# Patient Record
Sex: FEMALE | Employment: UNEMPLOYED | URBAN - METROPOLITAN AREA
[De-identification: names, ages, dates, MRNs, and addresses within clinical notes are randomized per-mention and may not be internally consistent; named-entity substitution may affect disease eponyms.]

---

## 2017-04-13 ENCOUNTER — HOSPITAL ENCOUNTER (EMERGENCY)
Age: 22
Discharge: HOME | End: 2017-04-13
Attending: EMERGENCY MEDICINE | Admitting: EMERGENCY MEDICINE
Payer: OTHER MISCELLANEOUS

## 2017-04-13 ENCOUNTER — APPOINTMENT (OUTPATIENT)
Dept: GENERAL RADIOLOGY | Age: 22
End: 2017-04-13
Attending: PHYSICIAN ASSISTANT
Payer: OTHER MISCELLANEOUS

## 2017-04-13 VITALS
WEIGHT: 118 LBS | TEMPERATURE: 100.1 F | BODY MASS INDEX: 17.88 KG/M2 | SYSTOLIC BLOOD PRESSURE: 115 MMHG | HEIGHT: 68 IN | DIASTOLIC BLOOD PRESSURE: 75 MMHG | OXYGEN SATURATION: 98 % | HEART RATE: 100 BPM | RESPIRATION RATE: 18 BRPM

## 2017-04-13 DIAGNOSIS — S41.151A DOG BITE OF RIGHT ARM, INITIAL ENCOUNTER: Primary | ICD-10-CM
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
## 2017-04-13 PROCEDURE — 96365 THER/PROPH/DIAG IV INF INIT: CPT

## 2017-04-13 PROCEDURE — 75810000293 HC SIMP/SUPERF WND  RPR

## 2017-04-13 PROCEDURE — 77030018836 HC SOL IRR NACL ICUM -A

## 2017-04-13 PROCEDURE — 77030031132 HC SUT NYL COVD -A

## 2017-04-13 PROCEDURE — 73060 X-RAY EXAM OF HUMERUS: CPT

## 2017-04-13 PROCEDURE — 96361 HYDRATE IV INFUSION ADD-ON: CPT

## 2017-04-13 PROCEDURE — 74011250637 HC RX REV CODE- 250/637: Performed by: PHYSICIAN ASSISTANT

## 2017-04-13 PROCEDURE — 74011000258 HC RX REV CODE- 258: Performed by: PHYSICIAN ASSISTANT

## 2017-04-13 PROCEDURE — 96375 TX/PRO/DX INJ NEW DRUG ADDON: CPT

## 2017-04-13 PROCEDURE — 99283 EMERGENCY DEPT VISIT LOW MDM: CPT

## 2017-04-13 PROCEDURE — 74011250636 HC RX REV CODE- 250/636: Performed by: PHYSICIAN ASSISTANT

## 2017-04-13 PROCEDURE — 74011000250 HC RX REV CODE- 250: Performed by: PHYSICIAN ASSISTANT

## 2017-04-13 PROCEDURE — 90471 IMMUNIZATION ADMIN: CPT

## 2017-04-13 PROCEDURE — 90715 TDAP VACCINE 7 YRS/> IM: CPT | Performed by: PHYSICIAN ASSISTANT

## 2017-04-13 RX ORDER — HYDROMORPHONE HYDROCHLORIDE 1 MG/ML
0.5 INJECTION, SOLUTION INTRAMUSCULAR; INTRAVENOUS; SUBCUTANEOUS
Status: COMPLETED | OUTPATIENT
Start: 2017-04-13 | End: 2017-04-13

## 2017-04-13 RX ORDER — ONDANSETRON 2 MG/ML
4 INJECTION INTRAMUSCULAR; INTRAVENOUS
Status: COMPLETED | OUTPATIENT
Start: 2017-04-13 | End: 2017-04-13

## 2017-04-13 RX ORDER — OXYCODONE AND ACETAMINOPHEN 5; 325 MG/1; MG/1
1 TABLET ORAL
Status: COMPLETED | OUTPATIENT
Start: 2017-04-13 | End: 2017-04-13

## 2017-04-13 RX ORDER — MORPHINE SULFATE 4 MG/ML
4 INJECTION, SOLUTION INTRAMUSCULAR; INTRAVENOUS ONCE
Status: COMPLETED | OUTPATIENT
Start: 2017-04-13 | End: 2017-04-13

## 2017-04-13 RX ORDER — LISINOPRIL 2.5 MG/1
TABLET ORAL DAILY
COMMUNITY

## 2017-04-13 RX ORDER — FLUOXETINE 10 MG/1
CAPSULE ORAL DAILY
COMMUNITY
End: 2017-08-09 | Stop reason: SDUPTHER

## 2017-04-13 RX ORDER — SODIUM CHLORIDE 9 MG/ML
1000 INJECTION, SOLUTION INTRAVENOUS ONCE
Status: COMPLETED | OUTPATIENT
Start: 2017-04-13 | End: 2017-04-13

## 2017-04-13 RX ORDER — NAPROXEN 375 MG/1
375 TABLET ORAL 2 TIMES DAILY WITH MEALS
Qty: 20 TAB | Refills: 0 | Status: SHIPPED | OUTPATIENT
Start: 2017-04-13 | End: 2017-08-09

## 2017-04-13 RX ORDER — AMOXICILLIN AND CLAVULANATE POTASSIUM 875; 125 MG/1; MG/1
1 TABLET, FILM COATED ORAL
Status: COMPLETED | OUTPATIENT
Start: 2017-04-13 | End: 2017-04-13

## 2017-04-13 RX ORDER — AMOXICILLIN AND CLAVULANATE POTASSIUM 875; 125 MG/1; MG/1
1 TABLET, FILM COATED ORAL 2 TIMES DAILY
Qty: 20 TAB | Refills: 0 | Status: SHIPPED | OUTPATIENT
Start: 2017-04-13 | End: 2017-08-09

## 2017-04-13 RX ORDER — ACETAMINOPHEN 325 MG/1
650 TABLET ORAL
Status: COMPLETED | OUTPATIENT
Start: 2017-04-13 | End: 2017-04-13

## 2017-04-13 RX ORDER — INSULIN ASPART 100 [IU]/ML
INJECTION, SUSPENSION SUBCUTANEOUS
COMMUNITY
End: 2017-08-09

## 2017-04-13 RX ORDER — OXYCODONE AND ACETAMINOPHEN 5; 325 MG/1; MG/1
TABLET ORAL
Qty: 20 TAB | Refills: 0 | Status: SHIPPED | OUTPATIENT
Start: 2017-04-13 | End: 2017-08-09

## 2017-04-13 RX ORDER — NORETHINDRONE ACETATE AND ETHINYL ESTRADIOL 1; .02 MG/1; MG/1
TABLET ORAL
COMMUNITY

## 2017-04-13 RX ADMIN — Medication 5 ML: at 10:47

## 2017-04-13 RX ADMIN — ONDANSETRON HYDROCHLORIDE 4 MG: 2 SOLUTION INTRAMUSCULAR; INTRAVENOUS at 10:35

## 2017-04-13 RX ADMIN — AMPICILLIN SODIUM AND SULBACTAM SODIUM 1.5 G: 1; .5 INJECTION, POWDER, FOR SOLUTION INTRAMUSCULAR; INTRAVENOUS at 16:00

## 2017-04-13 RX ADMIN — OXYCODONE HYDROCHLORIDE AND ACETAMINOPHEN 1 TABLET: 5; 325 TABLET ORAL at 13:43

## 2017-04-13 RX ADMIN — AMOXICILLIN AND CLAVULANATE POTASSIUM 1 TABLET: 875; 125 TABLET, FILM COATED ORAL at 13:45

## 2017-04-13 RX ADMIN — TETANUS TOXOID, REDUCED DIPHTHERIA TOXOID AND ACELLULAR PERTUSSIS VACCINE, ADSORBED 0.5 ML: 5; 2.5; 8; 8; 2.5 SUSPENSION INTRAMUSCULAR at 15:55

## 2017-04-13 RX ADMIN — ACETAMINOPHEN 650 MG: 325 TABLET ORAL at 16:01

## 2017-04-13 RX ADMIN — Medication 4 MG: at 10:35

## 2017-04-13 RX ADMIN — SODIUM CHLORIDE 1000 ML: 900 INJECTION, SOLUTION INTRAVENOUS at 10:37

## 2017-04-13 RX ADMIN — HYDROMORPHONE HYDROCHLORIDE 0.5 MG: 1 INJECTION, SOLUTION INTRAMUSCULAR; INTRAVENOUS; SUBCUTANEOUS at 11:25

## 2017-04-13 NOTE — ED TRIAGE NOTES
Was bitten on the right arm by a dog at the [employer] 2 deep punture wounds to the right arm. Bleeding is controlled.

## 2017-04-13 NOTE — ED NOTES
Pt states that her pain is better, but she is still hurting request more pain medication, pt states that she would not like to have tetunus shot at this time, but may decide to have the shot later

## 2017-04-13 NOTE — ED NOTES
Discharge paperwork reviewed, with pt, pt verbalized understanding.  Pt awaiting completion of IV antibiotic for discharge

## 2017-04-13 NOTE — DISCHARGE INSTRUCTIONS
Animal Bites: Care Instructions  Your Care Instructions  After an animal bite, the biggest concern is infection. The chance of infection depends on the type of animal that bit you, where on your body you were bitten, and your general health. Many animal bites are not closed with stitches, because this can increase the chance of infection. Your bite may take as little as 7 days or as long as several months to heal, depending on how bad it is. Taking good care of your wound at home will help it heal and reduce your chance of infection. The doctor has checked you carefully, but problems can develop later. If you notice any problems or new symptoms, get medical treatment right away. Follow-up care is a key part of your treatment and safety. Be sure to make and go to all appointments, and call your doctor if you are having problems. It's also a good idea to know your test results and keep a list of the medicines you take. How can you care for yourself at home? · If your doctor told you how to care for your wound, follow your doctor's instructions. If you did not get instructions, follow this general advice:  ¨ After 24 to 48 hours, gently wash the wound with clean water 2 times a day. Do not scrub or soak the wound. Don't use hydrogen peroxide or alcohol, which can slow healing. ¨ You may cover the wound with a thin layer of petroleum jelly, such as Vaseline, and a nonstick bandage. ¨ Apply more petroleum jelly and replace the bandage as needed. · After you shower, gently dry the wound with a clean towel. · If your doctor has closed the wound, cover the bandage with a plastic bag before you take a shower. · A small amount of skin redness and swelling around the wound edges and the stitches or staples is normal. Your wound may itch or feel irritated. Do not scratch or rub the wound.   · Ask your doctor if you can take an over-the-counter pain medicine, such as acetaminophen (Tylenol), ibuprofen (Advil, Motrin), or naproxen (Aleve). Read and follow all instructions on the label. · Do not take two or more pain medicines at the same time unless the doctor told you to. Many pain medicines have acetaminophen, which is Tylenol. Too much acetaminophen (Tylenol) can be harmful. · If your bite puts you at risk for rabies, you will get a series of shots over the next few weeks to prevent rabies. Your doctor will tell you when to get the shots. It is very important that you get the full cycle of shots. Follow your doctor's instructions exactly. · You may need a tetanus shot if you have not received one in the last 5 years. · If your doctor prescribed antibiotics, take them as directed. Do not stop taking them just because you feel better. You need to take the full course of antibiotics. When should you call for help? Call your doctor now or seek immediate medical care if:  · The skin near the bite turns cold or pale or it changes color. · You lose feeling in the area near the bite, or it feels numb or tingly. · You have trouble moving a limb near the bite. · You have symptoms of infection, such as:  ¨ Increased pain, swelling, warmth, or redness near the wound. ¨ Red streaks leading from the wound. ¨ Pus draining from the wound. ¨ A fever. · Blood soaks through the bandage. Oozing small amounts of blood is normal.  · Your pain is getting worse. Watch closely for changes in your health, and be sure to contact your doctor if you are not getting better as expected. Where can you learn more? Go to http://jackie-mor.info/. Enter Q644 in the search box to learn more about \"Animal Bites: Care Instructions. \"  Current as of: May 27, 2016  Content Version: 11.2  © 4163-8767 [employer]. Care instructions adapted under license by [employer] (which disclaims liability or warranty for this information).  If you have questions about a medical condition or this instruction, always ask your healthcare professional. [place] [address] any warranty or liability for your use of this information.

## 2017-04-13 NOTE — ED NOTES
I have reviewed discharge instructions with the patient. The patient verbalized understanding. IV discontinued, Animal control was in Ed to do report on dog.

## 2017-04-13 NOTE — ED NOTES
This nurse at bedside, to dress pts dog bite, pt has complaints of pain, provider made aware, new orders received

## 2017-08-09 RX ORDER — FLUOXETINE HYDROCHLORIDE 20 MG/1
20 CAPSULE ORAL DAILY
Refills: 10 | COMMUNITY
Start: 2017-07-19

## 2017-08-14 NOTE — DIABETES MGMT
INSULIN PUMP CONSULT/ Plan Of Care  How long have you had diabetes? Diagnosed with Type 1 diabetes at age 25  How long have you had an insulin pump? Not sure  What is your blood glucose target?  mg/dl  How often do you usually test your blood glucose in a day? Twice a day  What was your most recent A1C and date? unknown  Who is your endocrinologist? Dr. Marie Murcia  When was your last visit to your endocrinologist?  August 2017    INSULIN PUMP    Brand of pump and model #:  V-Go 20   Type of insulin used    What are your basal rate(s)?  0.83 units/hr   What is your insulin to carbohydrate ratio? What is your total daily dose? Changes daily based on carbs consumed     Do you often have hypoglycemia? Occasional hypoglycemia about 4 times/month  How do you treat hypoglycemia?  juice  Do you have any site problems?  no  Do you feel able and confident to manage your pump while here? Yes. Who helps you manage your insulin pump when you are not able? Patients  will be with patient. The V-Go insulin pump is changed every 24 hours. Patient usually changes hers around bedtime. Instructed patient to wear her pump in on the day of surgery. She knows not to give herself additional insulin because she will not be eating. Patient had no questions or concerns at this time.

## 2017-08-16 ENCOUNTER — ANESTHESIA EVENT (OUTPATIENT)
Dept: SURGERY | Age: 22
End: 2017-08-16
Payer: OTHER MISCELLANEOUS

## 2017-08-17 ENCOUNTER — HOSPITAL ENCOUNTER (OUTPATIENT)
Age: 22
Setting detail: OUTPATIENT SURGERY
Discharge: HOME | End: 2017-08-17
Attending: PLASTIC SURGERY | Admitting: PLASTIC SURGERY
Payer: OTHER MISCELLANEOUS

## 2017-08-17 ENCOUNTER — ANESTHESIA (OUTPATIENT)
Dept: SURGERY | Age: 22
End: 2017-08-17
Payer: OTHER MISCELLANEOUS

## 2017-08-17 VITALS
SYSTOLIC BLOOD PRESSURE: 111 MMHG | WEIGHT: 118.31 LBS | HEART RATE: 107 BPM | OXYGEN SATURATION: 100 % | HEIGHT: 68 IN | RESPIRATION RATE: 15 BRPM | BODY MASS INDEX: 17.93 KG/M2 | TEMPERATURE: 99.6 F | DIASTOLIC BLOOD PRESSURE: 67 MMHG

## 2017-08-17 LAB
GLUCOSE BLD STRIP.AUTO-MCNC: 168 MG/DL (ref 70–110)
GLUCOSE BLD STRIP.AUTO-MCNC: 231 MG/DL (ref 70–110)
GLUCOSE BLD STRIP.AUTO-MCNC: 311 MG/DL (ref 70–110)
GLUCOSE BLD STRIP.AUTO-MCNC: 312 MG/DL (ref 70–110)
GLUCOSE BLD STRIP.AUTO-MCNC: 313 MG/DL (ref 70–110)
HCG UR QL: NEGATIVE

## 2017-08-17 PROCEDURE — 77030000032 HC CUF TRNQT ZIMM -B: Performed by: PLASTIC SURGERY

## 2017-08-17 PROCEDURE — 77030034479 HC ADH SKN CLSR PRINEO J&J -B: Performed by: PLASTIC SURGERY

## 2017-08-17 PROCEDURE — 74011250636 HC RX REV CODE- 250/636: Performed by: NURSE ANESTHETIST, CERTIFIED REGISTERED

## 2017-08-17 PROCEDURE — 77030020753 HC CUF TRNQT 1BLA STRY -B: Performed by: PLASTIC SURGERY

## 2017-08-17 PROCEDURE — 74011250636 HC RX REV CODE- 250/636

## 2017-08-17 PROCEDURE — 77030002936 HC SUT MERS J&J -A: Performed by: PLASTIC SURGERY

## 2017-08-17 PROCEDURE — 77030010813: Performed by: PLASTIC SURGERY

## 2017-08-17 PROCEDURE — 77030008477 HC STYL SATN SLP COVD -A: Performed by: NURSE ANESTHETIST, CERTIFIED REGISTERED

## 2017-08-17 PROCEDURE — 76010000178 HC OR TIME 5.5 TO 6 HR INTENSV-TIER 1: Performed by: PLASTIC SURGERY

## 2017-08-17 PROCEDURE — 76210000016 HC OR PH I REC 1 TO 1.5 HR: Performed by: PLASTIC SURGERY

## 2017-08-17 PROCEDURE — 77030020335 HC PDNG CST COVD -A: Performed by: PLASTIC SURGERY

## 2017-08-17 PROCEDURE — 74011250637 HC RX REV CODE- 250/637: Performed by: ANESTHESIOLOGY

## 2017-08-17 PROCEDURE — 74011000258 HC RX REV CODE- 258

## 2017-08-17 PROCEDURE — 77030013079 HC BLNKT BAIR HGGR 3M -A: Performed by: NURSE ANESTHETIST, CERTIFIED REGISTERED

## 2017-08-17 PROCEDURE — 88305 TISSUE EXAM BY PATHOLOGIST: CPT | Performed by: PLASTIC SURGERY

## 2017-08-17 PROCEDURE — 77030008683 HC TU ET CUF COVD -A: Performed by: NURSE ANESTHETIST, CERTIFIED REGISTERED

## 2017-08-17 PROCEDURE — 74011636637 HC RX REV CODE- 636/637: Performed by: NURSE ANESTHETIST, CERTIFIED REGISTERED

## 2017-08-17 PROCEDURE — 77030002933 HC SUT MCRYL J&J -A: Performed by: PLASTIC SURGERY

## 2017-08-17 PROCEDURE — 77030031139 HC SUT VCRL2 J&J -A: Performed by: PLASTIC SURGERY

## 2017-08-17 PROCEDURE — 76060000042 HC ANESTHESIA 5.5 TO 6 HR: Performed by: PLASTIC SURGERY

## 2017-08-17 PROCEDURE — 77030012407 HC DRN WND BARD -B: Performed by: PLASTIC SURGERY

## 2017-08-17 PROCEDURE — 74011636637 HC RX REV CODE- 636/637: Performed by: ANESTHESIOLOGY

## 2017-08-17 PROCEDURE — 77030018842 HC SOL IRR SOD CL 9% BAXT -A: Performed by: PLASTIC SURGERY

## 2017-08-17 PROCEDURE — 77030002917 HC SUT ETHLN J&J -B: Performed by: PLASTIC SURGERY

## 2017-08-17 PROCEDURE — 77030032490 HC SLV COMPR SCD KNE COVD -B: Performed by: PLASTIC SURGERY

## 2017-08-17 PROCEDURE — 76210000020 HC REC RM PH II FIRST 0.5 HR: Performed by: PLASTIC SURGERY

## 2017-08-17 PROCEDURE — 82962 GLUCOSE BLOOD TEST: CPT

## 2017-08-17 PROCEDURE — 81025 URINE PREGNANCY TEST: CPT

## 2017-08-17 PROCEDURE — 74011250637 HC RX REV CODE- 250/637: Performed by: PLASTIC SURGERY

## 2017-08-17 PROCEDURE — 74011000272 HC RX REV CODE- 272: Performed by: PLASTIC SURGERY

## 2017-08-17 PROCEDURE — 74011000250 HC RX REV CODE- 250

## 2017-08-17 PROCEDURE — 77030011640 HC PAD GRND REM COVD -A: Performed by: PLASTIC SURGERY

## 2017-08-17 PROCEDURE — 77030011265 HC ELECTRD BLD HEX COVD -A: Performed by: PLASTIC SURGERY

## 2017-08-17 PROCEDURE — 77030002986 HC SUT PROL J&J -A: Performed by: PLASTIC SURGERY

## 2017-08-17 PROCEDURE — 74011250637 HC RX REV CODE- 250/637: Performed by: NURSE ANESTHETIST, CERTIFIED REGISTERED

## 2017-08-17 PROCEDURE — 77030020268 HC MISC GENERAL SUPPLY: Performed by: PLASTIC SURGERY

## 2017-08-17 PROCEDURE — 77030008462 HC STPLR SKN PROX J&J -A: Performed by: PLASTIC SURGERY

## 2017-08-17 PROCEDURE — 77030012422 HC DRN WND COVD -A: Performed by: PLASTIC SURGERY

## 2017-08-17 RX ORDER — LIDOCAINE HYDROCHLORIDE 20 MG/ML
INJECTION, SOLUTION EPIDURAL; INFILTRATION; INTRACAUDAL; PERINEURAL AS NEEDED
Status: DISCONTINUED | OUTPATIENT
Start: 2017-08-17 | End: 2017-08-17 | Stop reason: HOSPADM

## 2017-08-17 RX ORDER — MAGNESIUM SULFATE 100 %
4 CRYSTALS MISCELLANEOUS AS NEEDED
Status: DISCONTINUED | OUTPATIENT
Start: 2017-08-17 | End: 2017-08-17 | Stop reason: SDUPTHER

## 2017-08-17 RX ORDER — DEXTROSE 50 % IN WATER (D50W) INTRAVENOUS SYRINGE
25-50 AS NEEDED
Status: DISCONTINUED | OUTPATIENT
Start: 2017-08-17 | End: 2017-08-17 | Stop reason: HOSPADM

## 2017-08-17 RX ORDER — ONDANSETRON 2 MG/ML
INJECTION INTRAMUSCULAR; INTRAVENOUS AS NEEDED
Status: DISCONTINUED | OUTPATIENT
Start: 2017-08-17 | End: 2017-08-17 | Stop reason: HOSPADM

## 2017-08-17 RX ORDER — CEFAZOLIN SODIUM 1 G/3ML
INJECTION, POWDER, FOR SOLUTION INTRAMUSCULAR; INTRAVENOUS AS NEEDED
Status: DISCONTINUED | OUTPATIENT
Start: 2017-08-17 | End: 2017-08-17 | Stop reason: HOSPADM

## 2017-08-17 RX ORDER — DEXTROSE 50 % IN WATER (D50W) INTRAVENOUS SYRINGE
25-50 AS NEEDED
Status: DISCONTINUED | OUTPATIENT
Start: 2017-08-17 | End: 2017-08-17 | Stop reason: SDUPTHER

## 2017-08-17 RX ORDER — INSULIN LISPRO 100 [IU]/ML
INJECTION, SOLUTION INTRAVENOUS; SUBCUTANEOUS ONCE
Status: DISCONTINUED | OUTPATIENT
Start: 2017-08-17 | End: 2017-08-17 | Stop reason: SDUPTHER

## 2017-08-17 RX ORDER — HYDROMORPHONE HYDROCHLORIDE 2 MG/ML
INJECTION, SOLUTION INTRAMUSCULAR; INTRAVENOUS; SUBCUTANEOUS AS NEEDED
Status: DISCONTINUED | OUTPATIENT
Start: 2017-08-17 | End: 2017-08-17 | Stop reason: HOSPADM

## 2017-08-17 RX ORDER — PROPOFOL 10 MG/ML
INJECTION, EMULSION INTRAVENOUS AS NEEDED
Status: DISCONTINUED | OUTPATIENT
Start: 2017-08-17 | End: 2017-08-17 | Stop reason: HOSPADM

## 2017-08-17 RX ORDER — HYDROMORPHONE HYDROCHLORIDE 1 MG/ML
0.5 INJECTION, SOLUTION INTRAMUSCULAR; INTRAVENOUS; SUBCUTANEOUS
Status: DISCONTINUED | OUTPATIENT
Start: 2017-08-17 | End: 2017-08-17 | Stop reason: HOSPADM

## 2017-08-17 RX ORDER — SUCCINYLCHOLINE CHLORIDE 20 MG/ML
INJECTION INTRAMUSCULAR; INTRAVENOUS AS NEEDED
Status: DISCONTINUED | OUTPATIENT
Start: 2017-08-17 | End: 2017-08-17 | Stop reason: HOSPADM

## 2017-08-17 RX ORDER — CEPHALEXIN 500 MG/1
500 CAPSULE ORAL 4 TIMES DAILY
Qty: 28 CAP | Refills: 0 | Status: SHIPPED | OUTPATIENT
Start: 2017-08-17 | End: 2017-08-24

## 2017-08-17 RX ORDER — INSULIN LISPRO 100 [IU]/ML
INJECTION, SOLUTION INTRAVENOUS; SUBCUTANEOUS ONCE
Status: COMPLETED | OUTPATIENT
Start: 2017-08-17 | End: 2017-08-17

## 2017-08-17 RX ORDER — INSULIN LISPRO 100 [IU]/ML
INJECTION, SOLUTION INTRAVENOUS; SUBCUTANEOUS
Status: DISCONTINUED
Start: 2017-08-17 | End: 2017-08-17 | Stop reason: HOSPADM

## 2017-08-17 RX ORDER — MAGNESIUM SULFATE 100 %
4 CRYSTALS MISCELLANEOUS AS NEEDED
Status: DISCONTINUED | OUTPATIENT
Start: 2017-08-17 | End: 2017-08-17 | Stop reason: HOSPADM

## 2017-08-17 RX ORDER — FENTANYL CITRATE 50 UG/ML
50 INJECTION, SOLUTION INTRAMUSCULAR; INTRAVENOUS AS NEEDED
Status: DISCONTINUED | OUTPATIENT
Start: 2017-08-17 | End: 2017-08-17 | Stop reason: HOSPADM

## 2017-08-17 RX ORDER — SODIUM CHLORIDE, SODIUM LACTATE, POTASSIUM CHLORIDE, CALCIUM CHLORIDE 600; 310; 30; 20 MG/100ML; MG/100ML; MG/100ML; MG/100ML
75 INJECTION, SOLUTION INTRAVENOUS CONTINUOUS
Status: DISCONTINUED | OUTPATIENT
Start: 2017-08-17 | End: 2017-08-17 | Stop reason: HOSPADM

## 2017-08-17 RX ORDER — MIDAZOLAM HYDROCHLORIDE 1 MG/ML
INJECTION, SOLUTION INTRAMUSCULAR; INTRAVENOUS AS NEEDED
Status: DISCONTINUED | OUTPATIENT
Start: 2017-08-17 | End: 2017-08-17 | Stop reason: HOSPADM

## 2017-08-17 RX ORDER — OXYCODONE AND ACETAMINOPHEN 5; 325 MG/1; MG/1
1 TABLET ORAL
Status: COMPLETED | OUTPATIENT
Start: 2017-08-17 | End: 2017-08-17

## 2017-08-17 RX ORDER — FENTANYL CITRATE 50 UG/ML
INJECTION, SOLUTION INTRAMUSCULAR; INTRAVENOUS AS NEEDED
Status: DISCONTINUED | OUTPATIENT
Start: 2017-08-17 | End: 2017-08-17 | Stop reason: HOSPADM

## 2017-08-17 RX ORDER — FAMOTIDINE 20 MG/1
20 TABLET, FILM COATED ORAL ONCE
Status: COMPLETED | OUTPATIENT
Start: 2017-08-17 | End: 2017-08-17

## 2017-08-17 RX ORDER — ONDANSETRON 4 MG/1
4 TABLET, ORALLY DISINTEGRATING ORAL
Qty: 6 TAB | Refills: 0 | Status: SHIPPED | OUTPATIENT
Start: 2017-08-17

## 2017-08-17 RX ORDER — DEXAMETHASONE SODIUM PHOSPHATE 4 MG/ML
INJECTION, SOLUTION INTRA-ARTICULAR; INTRALESIONAL; INTRAMUSCULAR; INTRAVENOUS; SOFT TISSUE AS NEEDED
Status: DISCONTINUED | OUTPATIENT
Start: 2017-08-17 | End: 2017-08-17 | Stop reason: HOSPADM

## 2017-08-17 RX ORDER — OXYCODONE AND ACETAMINOPHEN 7.5; 325 MG/1; MG/1
1 TABLET ORAL
Qty: 36 TAB | Refills: 0 | Status: SHIPPED | OUTPATIENT
Start: 2017-08-17

## 2017-08-17 RX ORDER — SODIUM CHLORIDE, SODIUM LACTATE, POTASSIUM CHLORIDE, CALCIUM CHLORIDE 600; 310; 30; 20 MG/100ML; MG/100ML; MG/100ML; MG/100ML
50 INJECTION, SOLUTION INTRAVENOUS CONTINUOUS
Status: DISCONTINUED | OUTPATIENT
Start: 2017-08-17 | End: 2017-08-17 | Stop reason: HOSPADM

## 2017-08-17 RX ADMIN — SODIUM CHLORIDE, SODIUM LACTATE, POTASSIUM CHLORIDE, AND CALCIUM CHLORIDE 75 ML/HR: 600; 310; 30; 20 INJECTION, SOLUTION INTRAVENOUS at 09:25

## 2017-08-17 RX ADMIN — SUCCINYLCHOLINE CHLORIDE 80 MG: 20 INJECTION INTRAMUSCULAR; INTRAVENOUS at 11:07

## 2017-08-17 RX ADMIN — INSULIN LISPRO 3 UNITS: 100 INJECTION, SOLUTION INTRAVENOUS; SUBCUTANEOUS at 09:52

## 2017-08-17 RX ADMIN — HYDROMORPHONE HYDROCHLORIDE 1 MG: 2 INJECTION, SOLUTION INTRAMUSCULAR; INTRAVENOUS; SUBCUTANEOUS at 11:04

## 2017-08-17 RX ADMIN — SODIUM CHLORIDE, SODIUM LACTATE, POTASSIUM CHLORIDE, AND CALCIUM CHLORIDE: 600; 310; 30; 20 INJECTION, SOLUTION INTRAVENOUS at 16:29

## 2017-08-17 RX ADMIN — INSULIN LISPRO 12 UNITS: 100 INJECTION, SOLUTION INTRAVENOUS; SUBCUTANEOUS at 17:30

## 2017-08-17 RX ADMIN — FAMOTIDINE 20 MG: 20 TABLET ORAL at 09:28

## 2017-08-17 RX ADMIN — ONDANSETRON 4 MG: 2 INJECTION INTRAMUSCULAR; INTRAVENOUS at 11:04

## 2017-08-17 RX ADMIN — FENTANYL CITRATE 25 MCG: 50 INJECTION, SOLUTION INTRAMUSCULAR; INTRAVENOUS at 17:39

## 2017-08-17 RX ADMIN — OXYCODONE AND ACETAMINOPHEN 1 TABLET: 5; 325 TABLET ORAL at 18:20

## 2017-08-17 RX ADMIN — FENTANYL CITRATE 50 MCG: 50 INJECTION, SOLUTION INTRAMUSCULAR; INTRAVENOUS at 14:27

## 2017-08-17 RX ADMIN — FENTANYL CITRATE 50 MCG: 50 INJECTION, SOLUTION INTRAMUSCULAR; INTRAVENOUS at 14:55

## 2017-08-17 RX ADMIN — MIDAZOLAM HYDROCHLORIDE 2 MG: 1 INJECTION, SOLUTION INTRAMUSCULAR; INTRAVENOUS at 11:01

## 2017-08-17 RX ADMIN — SODIUM CHLORIDE, SODIUM LACTATE, POTASSIUM CHLORIDE, AND CALCIUM CHLORIDE: 600; 310; 30; 20 INJECTION, SOLUTION INTRAVENOUS at 13:00

## 2017-08-17 RX ADMIN — HYDROMORPHONE HYDROCHLORIDE 1 MG: 2 INJECTION, SOLUTION INTRAMUSCULAR; INTRAVENOUS; SUBCUTANEOUS at 11:07

## 2017-08-17 RX ADMIN — LIDOCAINE HYDROCHLORIDE 40 MG: 20 INJECTION, SOLUTION EPIDURAL; INFILTRATION; INTRACAUDAL; PERINEURAL at 11:07

## 2017-08-17 RX ADMIN — PROPOFOL 180 MG: 10 INJECTION, EMULSION INTRAVENOUS at 11:07

## 2017-08-17 RX ADMIN — CEFAZOLIN SODIUM 1 G: 1 INJECTION, POWDER, FOR SOLUTION INTRAMUSCULAR; INTRAVENOUS at 11:21

## 2017-08-17 RX ADMIN — DEXAMETHASONE SODIUM PHOSPHATE 4 MG: 4 INJECTION, SOLUTION INTRA-ARTICULAR; INTRALESIONAL; INTRAMUSCULAR; INTRAVENOUS; SOFT TISSUE at 11:04

## 2017-08-17 RX ADMIN — INSULIN LISPRO 12 UNITS: 100 INJECTION, SOLUTION INTRAVENOUS; SUBCUTANEOUS at 16:51

## 2017-08-17 NOTE — ANESTHESIA POSTPROCEDURE EVALUATION
Post-Anesthesia Evaluation and Assessment    Patient: Carlos Rinaldi MRN: [id number]  SSN: [id number]    YOB: 1995  Age: 25 y.o. Sex: female      Data from PACU flowsheet    Cardiovascular Function/Vital Signs  Visit Vitals    /67    Pulse (!) 115    Temp 37.6 °C (99.6 °F)    Resp 10    Ht 5' 8\" (1.727 m)    Wt 53.7 kg (118 lb 5 oz)    SpO2 100%    BMI 17.99 kg/m2       Patient is status post anesthesia    Nausea/Vomiting: controlled    Postoperative hydration reviewed and adequate. Pain:  Managed    Neurological Status: At baseline    Mental Status and Level of Consciousness: Alert and oriented     Pulmonary Status:   Adequate oxygenation and airway patent    Complications related to anesthesia: None    Post-anesthesia assessment completed. Blood sugar being managed.      Signed By: Devora Welch CRNA     August 17, 2017

## 2017-08-17 NOTE — H&P
Date of Surgery Update:  Carlos Rinaldi was seen and examined. History and physical has been reviewed. The patient has been examined.  There have been no significant clinical changes since the completion of the originally dated History and Physical.    Signed By: Melissa Jacobs MD     August 17, 2017 10:03 AM

## 2017-08-17 NOTE — PERIOP NOTES
Per Dr. Elina Phillip, no further treatment needed for BG; pt would like to reconnect to her insulin pump after discharge.

## 2017-08-17 NOTE — DISCHARGE INSTRUCTIONS
DISCHARGE SUMMARY from Nurse    The following personal items are in your possession at time of discharge:    Dental Appliances: None  Visual Aid: Glasses     Home Medications: None  Jewelry: Ring  Clothing: Undergarments, Shirt, Pants, Footwear  Other Valuables: None             PATIENT INSTRUCTIONS:    After general anesthesia or intravenous sedation, for 24 hours or while taking prescription Narcotics:  · Limit your activities  · Do not drive and operate hazardous machinery  · Do not make important personal or business decisions  · Do  not drink alcoholic beverages  · If you have not urinated within 8 hours after discharge, please contact your surgeon on call. Report the following to your surgeon:  · Excessive pain, swelling, redness or odor of or around the surgical area  · Temperature over 100.5  · Nausea and vomiting lasting longer than 4 hours or if unable to take medications  · Any signs of decreased circulation or nerve impairment to extremity: change in color, persistent  numbness, tingling, coldness or increase pain  · Any questions        What to do at Home:  Recommended activity: Activity as tolerated and no driving for today. These are general instructions for a healthy lifestyle:    No smoking/ No tobacco products/ Avoid exposure to second hand smoke    Surgeon General's Warning:  Quitting smoking now greatly reduces serious risk to your health. Obesity, smoking, and sedentary lifestyle greatly increases your risk for illness    A healthy diet, regular physical exercise & weight monitoring are important for maintaining a healthy lifestyle    You may be retaining fluid if you have a history of heart failure or if you experience any of the following symptoms:  Weight gain of 3 pounds or more overnight or 5 pounds in a week, increased swelling in our hands or feet or shortness of breath while lying flat in bed.   Please call your doctor as soon as you notice any of these symptoms; do not wait until your next office visit. Recognize signs and symptoms of STROKE:    F-face looks uneven    A-arms unable to move or move unevenly    S-speech slurred or non-existent    T-time-call 911 as soon as signs and symptoms begin-DO NOT go       Back to bed or wait to see if you get better-TIME IS BRAIN. Warning Signs of HEART ATTACK     Call 911 if you have these symptoms:   Chest discomfort. Most heart attacks involve discomfort in the center of the chest that lasts more than a few minutes, or that goes away and comes back. It can feel like uncomfortable pressure, squeezing, fullness, or pain.  Discomfort in other areas of the upper body. Symptoms can include pain or discomfort in one or both arms, the back, neck, jaw, or stomach.  Shortness of breath with or without chest discomfort.  Other signs may include breaking out in a cold sweat, nausea, or lightheadedness. Don't wait more than five minutes to call 911 - MINUTES MATTER! Fast action can save your life. Calling 911 is almost always the fastest way to get lifesaving treatment. Emergency Medical Services staff can begin treatment when they arrive -- up to an hour sooner than if someone gets to the hospital by car. The discharge information has been reviewed with the patient. The patient verbalized understanding. Discharge medications reviewed with the patient and appropriate educational materials and side effects teaching were provided. Patient armband removed and given to patient to take home.   Patient was informed of the privacy risks if armband lost or stolen

## 2017-08-17 NOTE — DIABETES MGMT
Patient removed home insulin pump and will resume when she arrives home.  mg/dl. Patient covered with lispro 3 units per insulin protocol. Patient had no questions or concerns at this time.

## 2017-08-17 NOTE — IP AVS SNAPSHOT
Bhargav Saeed 
 
 
 [address] [place] [address] Drive 
[phone] Patient: Mason Murillo MRN: [id number] GEP:[birth year] You are allergic to the following No active allergies Recent Documentation Height Weight BMI OB Status Smoking Status 1.727 m 53.7 kg 17.99 kg/m2 Having regular periods Never Smoker Emergency Contacts Name Discharge Info Relation Home Work Mobile Gasper Mueller DISCHARGE CAREGIVER [3] Spouse [3]   [phone] About your hospitalization You were admitted on:  August 17, 2017 [facility] on:  August 17, 2017 Unit phone number:  [phone] Why you were hospitalized Your primary diagnosis was:  Not on File Providers Seen During Your Hospitalizations Provider Role Specialty Primary office phone Leland Herrera MD Attending Provider Plastic Surgery [phone] Your Primary Care Physician (PCP) Primary Care Physician Office Phone Office Fax OTHER, PHYS ** None ** ** None ** Follow-up Information Follow up With Details Comments Contact Info Marylu Mondragon MD   Patient can only remember the practice name and not the physician Current Discharge Medication List  
  
START taking these medications Dose & Instructions Dispensing Information Comments Morning Noon Evening Bedtime  
 cephALEXin 500 mg capsule Commonly known as:  Polo [place] Your last dose was: Your next dose is:    
   
   
 Dose:  500 mg Take 1 Cap by mouth four (4) times daily for 7 days. Quantity:  28 Cap Refills:  0  
     
   
   
   
  
 ondansetron 4 mg disintegrating tablet Commonly known as:  ZOFRAN ODT Your last dose was: Your next dose is:    
   
   
 Dose:  4 mg Take 1 Tab by mouth every eight (8) hours as needed for Nausea. Quantity:  6 Tab Refills:  0 oxyCODONE-acetaminophen 7.5-325 mg per tablet Commonly known as:  PERCOCET 7.5 Your last dose was: Your next dose is:    
   
   
 Dose:  1 Tab Take 1 Tab by mouth every four (4) hours as needed for Pain. Max Daily Amount: 6 Tabs. Quantity:  36 Tab Refills:  0 CONTINUE these medications which have NOT CHANGED Dose & Instructions Dispensing Information Comments Morning Noon Evening Bedtime  
  insulin pump Misc Commonly known as:  PATIENT SUPPLIED Your last dose was: Your next dose is:    
   
   
 by SubCUTAneous route as needed. Refills:  0 FLUoxetine 20 mg capsule Commonly known as:  PROzac Your last dose was: Your next dose is:    
   
   
 Dose:  20 mg Take 20 mg by mouth daily. Refills:  10 JUNEL 1/20 (21) 1-20 mg-mcg Tab Generic drug:  norethindrone-ethinyl estradiol Your last dose was: Your next dose is: Take  by mouth. Refills:  0  
     
   
   
   
  
 lisinopril 2.5 mg tablet Commonly known as:  Velora Ed Your last dose was: Your next dose is: Take  by mouth daily. Refills:  0 Where to Get Your Medications Information on where to get these meds will be given to you by the nurse or doctor. ! Ask your nurse or doctor about these medications  
  cephALEXin 500 mg capsule  
 ondansetron 4 mg disintegrating tablet  
 oxyCODONE-acetaminophen 7.5-325 mg per tablet Discharge Instructions DISCHARGE SUMMARY from Nurse The following personal items are in your possession at time of discharge: 
 
Dental Appliances: None Visual Aid: Glasses Home Medications: None Jewelry: Ring Clothing: Undergarments, Shirt, Pants, Footwear Other Valuables: None PATIENT INSTRUCTIONS: 
 
 After general anesthesia or intravenous sedation, for 24 hours or while taking prescription Narcotics: · Limit your activities · Do not drive and operate hazardous machinery · Do not make important personal or business decisions · Do  not drink alcoholic beverages · If you have not urinated within 8 hours after discharge, please contact your surgeon on call. Report the following to your surgeon: 
· Excessive pain, swelling, redness or odor of or around the surgical area · Temperature over 100.5 · Nausea and vomiting lasting longer than 4 hours or if unable to take medications · Any signs of decreased circulation or nerve impairment to extremity: change in color, persistent  numbness, tingling, coldness or increase pain · Any questions What to do at Home: 
Recommended activity: Activity as tolerated and no driving for today. These are general instructions for a healthy lifestyle: No smoking/ No tobacco products/ Avoid exposure to second hand smoke Surgeon General's Warning:  Quitting smoking now greatly reduces serious risk to your health. Obesity, smoking, and sedentary lifestyle greatly increases your risk for illness A healthy diet, regular physical exercise & weight monitoring are important for maintaining a healthy lifestyle You may be retaining fluid if you have a history of heart failure or if you experience any of the following symptoms:  Weight gain of 3 pounds or more overnight or 5 pounds in a week, increased swelling in our hands or feet or shortness of breath while lying flat in bed. Please call your doctor as soon as you notice any of these symptoms; do not wait until your next office visit. Recognize signs and symptoms of STROKE: 
 
F-face looks uneven A-arms unable to move or move unevenly S-speech slurred or non-existent T-time-call 911 as soon as signs and symptoms begin-DO NOT go Back to bed or wait to see if you get better-TIME IS BRAIN. Warning Signs of HEART ATTACK Call 911 if you have these symptoms: 
? Chest discomfort. Most heart attacks involve discomfort in the center of the chest that lasts more than a few minutes, or that goes away and comes back. It can feel like uncomfortable pressure, squeezing, fullness, or pain. ? Discomfort in other areas of the upper body. Symptoms can include pain or discomfort in one or both arms, the back, neck, jaw, or stomach. ? Shortness of breath with or without chest discomfort. ? Other signs may include breaking out in a cold sweat, nausea, or lightheadedness. Don't wait more than five minutes to call [address]! Fast action can save your life. Calling 911 is almost always the fastest way to get lifesaving treatment. Emergency Medical Services staff can begin treatment when they arrive  up to an hour sooner than if someone gets to the hospital by car. The discharge information has been reviewed with the patient. The patient verbalized understanding. Discharge medications reviewed with the patient and appropriate educational materials and side effects teaching were provided. Patient armband removed and given to patient to take home. Patient was informed of the privacy risks if armband lost or stolen Discharge Orders None Introducing [facility] SERVICES! Sandra Montez introduces [employer]terG patient portal. Now you can access parts of your medical record, email your doctor's office, and request medication refills online. 1. In your internet browser, go to [email or web address] 2. Click on the First Time User? Click Here link in the Sign In box. You will see the New Member Sign Up page. 3. Enter your [employer] Access Code exactly as it appears below. You will not need to use this code after youve completed the sign-up process. If you do not sign up before the expiration date, you must request a new code. · [employer]ter[employer] Access Code: [email or web address] Expires: 11/15/2017  7:16 AM 
 
4. Enter the last four digits of your Social Security Number (xxxx) and Date of Birth (mm/dd/yyyy) as indicated and click Submit. You will be taken to the next sign-up page. 5. Create a Triviala ID. This will be your Triviala login ID and cannot be changed, so think of one that is secure and easy to remember. 6. Create a Triviala password. You can change your password at any time. 7. Enter your Password Reset Question and Answer. This can be used at a later time if you forget your password. 8. Enter your e-mail address. You will receive e-mail notification when new information is available in [address] 9. Click Sign Up. You can now view and download portions of your medical record. 10. Click the Download Summary menu link to download a portable copy of your medical information. If you have questions, please visit the Frequently Asked Questions section of the Triviala website. Remember, Triviala is NOT to be used for urgent needs. For medical emergencies, dial 911. Now available from your iPhone and Android! General Information Please provide this summary of care documentation to your next provider. Patient Signature:  ____________________________________________________________ Date:  ____________________________________________________________  
  
Myrna Mack Provider Signature:  ____________________________________________________________ Date:  ____________________________________________________________

## 2017-08-17 NOTE — PERIOP NOTES
[id number] Received Patient to PACU. To monitors per protocol. Will continued to monitor.      1620 Paged Dr Kayy Hernandez and made aware of BS of 311 after 12 U lispro orders to cont S S protocol   9214  Report to [facility] RN shift relief

## 2017-08-17 NOTE — PERIOP NOTES
Called out to the waiting room to speak with the patient's family member (  Quirino East) but no answer/response to be informed that the patient is doing well and that the surgery is ongoing, will call out to the waiting room later.

## 2017-08-17 NOTE — ANESTHESIA PREPROCEDURE EVALUATION
Anesthetic History   No history of anesthetic complications            Review of Systems / Medical History  Patient summary reviewed and pertinent labs reviewed    Pulmonary  Within defined limits                 Neuro/Psych   Within defined limits           Cardiovascular  Within defined limits                Exercise tolerance: >4 METS     GI/Hepatic/Renal  Within defined limits              Endo/Other    Diabetes: type 1, using insulin         Other Findings   Comments:   Risk Factors for Postoperative nausea/vomiting:       History of postoperative nausea/vomiting? NO       Female? YES       Motion sickness? NO       Intended opioid administration for postoperative analgesia? YES      Smoking Abstinence  Current Smoker? NO  Elective Surgery? YES  Seen preoperatively by anesthesiologist or proxy prior to day of surgery? YES  Pt abstained from smoking 24 hours prior to anesthesia?  N/A           Physical Exam    Airway  Mallampati: II  TM Distance: 4 - 6 cm  Neck ROM: normal range of motion   Mouth opening: Normal     Cardiovascular  Regular rate and rhythm,  S1 and S2 normal,  no murmur, click, rub, or gallop  Rhythm: regular  Rate: normal         Dental  No notable dental hx       Pulmonary  Breath sounds clear to auscultation               Abdominal  GI exam deferred       Other Findings            Anesthetic Plan    ASA: 2  Anesthesia type: general          Induction: Intravenous  Anesthetic plan and risks discussed with: Patient

## 2017-08-22 NOTE — OP NOTES
[place]    Name:  Maverick Gonzalez  MR#:  [id number]  :  1995  Account #:  [de-identified]  Date of Adm:  2017  Date of Surgery:  2017      PREOPERATIVE DIAGNOSIS: Dog bite, right upper arm. POSTOPERATIVE DIAGNOSIS: Dog bite, right upper arm, with  transection of radial nerve in lower third of upper arm. PROCEDURES PERFORMED  1. Microsurgical exploration, radial nerve, right upper arm, with  resection of neuroma and placement of cylindrical Silastic stent  between cut ends of radial nerve. 2. Tendon transfers for wrist, hand and thumb extension using pronator  teres to extensor carpi radialis brevis, flexor carpi ulnaris to extensor  digitorum communis x4 fingers, and palmaris longus to rerouted  extensor pollicis longus tendon. ANESTHESIA: General endotracheal.    ESTIMATED BLOOD LOSS: Approximately 50-75 mL. SPECIMENS REMOVED: The only specimen submitted to Pathology  was a resected portion of the scar tissue of the neuroma of the radial  nerve removed from the upper arm. COMPLICATIONS: None. INDICATIONS: This patient underwent a traumatic experience when a  dog that she was handling grabbed her right upper arm and bit through  the soft tissues, immediately producing loss of extensor capability in  her wrist, fingers and thumb. This injury was centered on the lower  portion of this very thin patient's upper arm in the region of the spiral  groove of the radial nerve, and efforts at physical therapy rehabilitation  had produced absolutely no return of function and the patient  presented with the obvious signs of a completely transected radial  nerve at the site of one of the larger puncture wounds from the dog's  canine teeth.  The procedure initially was hoped to be the realignment  of the 2 ends of the radial nerve with removal of any interposed scar  tissue, and then resuture under the microscope but, unfortunately, it  was evident that the damage to the nerve was of sufficient extent that  the only way to bring the 2 ends together was to acutely flex the elbow  to 90 degrees, which would have limited the ability of this patient to  recover from a tendon transfer, and also would be a poor way to hope  for any return of function of the nerve repair as it was too tight. Consequently, decision was made after resection of the interposed  scar tissue to place a spacer of 15-Danish Silastic tubing into which the  proximal and distal ends of the dissected nerves could be placed and  secured with a number of 6-0 Prolene sutures, so as to align the 2  ends of the nerve and possibly have nerve sprouts begin within that  tube, and come back and do interposed reversed sural nerve grafts to  reconstitute the nerve at a later time. At this time, however, the  proposed tendon transfers were done. The transfers being utilized  were the pronator teres being brought around the ulnar side of the  brachioradialis muscle and sutured dorsally to the extensor carpi  radialis brevis tendon via appropriately placed incisions, and then the  flexor carpi ulnaris being brought around the ulnar border of the wrist  and forearm and attached to all 4 of the extensor digitorum communis  tendons, including the extensor digiti minimi. The palmaris longus  tendon was elevated and sutured to the extensor pollicis longus  tendon, which was rerouted from the level of the metacarpophalangeal  joint of the thumb, across the dorsal volar aspect of the thenar  eminence subcutaneously into a straight line anastomosis of these 2  structures so as to give this patient thumb extension as well as the  other extensor capabilities of the metacarpophalangeal joints and the  wrist joint.  All of these repairs were done using a tendon interlacing  forceps and 3-0 Mersilene suture with multiple weaving being achieved  to obtain secure fixation of the rearranged tendon fixation points, and  placing the wrist in dorsiflexion, but the metacarpophalangeal joints in  neutral and the thumb in tight abduction and extension. Once these transfers had been accomplished, the tourniquet which  had been placed on the arm at the start of the procedure was  removed, allowing for hemostasis to be obtained, but recognizing the  fact that dissection had all been done using the bipolar cautery and  that very little additional hemostasis was required. The dissection for  the nerve was achieved at the start of the procedure and 2 separate  ischemic periods were utilized for this entire procedure with an  approximately half hour between them to allow for reperfusion of the  extremity, and on both occasions the tourniquet was still device placed  above the proposed exploration area on the upper arm and was  removed at the termination of the second portion of the procedure for  the tendon transfers. Closure of the incisions was then achieved using 3-0 Vicryl and 4-0  Monocryl, followed by the placement of Prineo Dermabond tape. No  drains were placed on either the upper or forearm areas, as very  adequate hemostasis was obtained. The arm was immobilized with a  volar OCL fiberglass splint, 5 inches wide, placed so as to keep the  fingers in full extension in neutral, the wrist in slight dorsiflexion, and  the thumb in abducted and extended position. The extremity was  wrapped with Webril over the OCL splint, which reached almost to the  elbow, and was then wrapped with 3 Ace bandages from the elbow all  the way out to the fingertips. An additional dressing of Prineo tape and  gauze, 4 x 4's, with a Kerlix bandage and separate Ace bandage was  placed on the access incision to the radial nerve on the lower third of  the upper arm. The patient was then able to be awakened and  transferred to the recovery room.         MD KIKE Hernandez / GISSELL  D:  08/21/2017   21:37  T:  08/21/2017   23:12  Job #:  [id number]

## 2018-11-16 ENCOUNTER — APPOINTMENT (OUTPATIENT)
Dept: GENERAL RADIOLOGY | Age: 23
DRG: 638 | End: 2018-11-16
Attending: EMERGENCY MEDICINE
Payer: SELF-PAY

## 2018-11-16 ENCOUNTER — HOSPITAL ENCOUNTER (INPATIENT)
Age: 23
LOS: 2 days | Discharge: HOME | DRG: 638 | End: 2018-11-18
Attending: EMERGENCY MEDICINE | Admitting: INTERNAL MEDICINE
Payer: SELF-PAY

## 2018-11-16 PROBLEM — F32.A DEPRESSION: Status: ACTIVE | Noted: 2018-11-16

## 2018-11-16 PROBLEM — E11.10 DKA (DIABETIC KETOACIDOSES): Status: ACTIVE | Noted: 2018-11-16

## 2018-11-16 PROBLEM — R06.4: Status: ACTIVE | Noted: 2018-11-16

## 2018-11-16 PROBLEM — E87.1 HYPONATREMIA: Status: ACTIVE | Noted: 2018-11-16

## 2018-11-16 PROBLEM — N17.9 ACUTE RENAL FAILURE (ARF) (HCC): Status: ACTIVE | Noted: 2018-11-16

## 2018-11-16 PROBLEM — D72.829 LEUKOCYTOSIS: Status: ACTIVE | Noted: 2018-11-16

## 2018-11-16 LAB
ADMINISTERED INITIALS, ADMINIT: NORMAL
ALBUMIN SERPL-MCNC: 4.7 G/DL (ref 3.4–5)
ALBUMIN/GLOB SERPL: 0.8 {RATIO} (ref 0.8–1.7)
ALP SERPL-CCNC: 154 U/L (ref 45–117)
ALT SERPL-CCNC: 18 U/L (ref 13–56)
ANION GAP SERPL CALC-SCNC: 11 MMOL/L (ref 3–18)
ANION GAP SERPL CALC-SCNC: 18 MMOL/L (ref 3–18)
ANION GAP SERPL CALC-SCNC: 9 MMOL/L (ref 3–18)
ANION GAP SERPL CALC-SCNC: ABNORMAL MMOL/L (ref 3–18)
ANION GAP SERPL CALC-SCNC: ABNORMAL MMOL/L (ref 3–18)
APPEARANCE UR: CLEAR
ARTERIAL PATENCY WRIST A: YES
ARTERIAL PATENCY WRIST A: YES
AST SERPL-CCNC: 13 U/L (ref 15–37)
ATRIAL RATE: 121 BPM
BACTERIA URNS QL MICRO: ABNORMAL /HPF
BASE DEFICIT BLD-SCNC: 25 MMOL/L
BASE DEFICIT BLD-SCNC: <30 MMOL/L
BASOPHILS # BLD: 0 K/UL (ref 0–0.06)
BASOPHILS # BLD: 0 K/UL (ref 0–0.06)
BASOPHILS NFR BLD: 0 % (ref 0–3)
BASOPHILS NFR BLD: 0 % (ref 0–3)
BDY SITE: ABNORMAL
BDY SITE: ABNORMAL
BILIRUB SERPL-MCNC: 0.4 MG/DL (ref 0.2–1)
BILIRUB UR QL: NEGATIVE
BODY TEMPERATURE: 37
BUN SERPL-MCNC: 11 MG/DL (ref 7–18)
BUN SERPL-MCNC: 13 MG/DL (ref 7–18)
BUN SERPL-MCNC: 15 MG/DL (ref 7–18)
BUN SERPL-MCNC: 18 MG/DL (ref 7–18)
BUN SERPL-MCNC: 20 MG/DL (ref 7–18)
BUN/CREAT SERPL: 13 (ref 12–20)
BUN/CREAT SERPL: 16 (ref 12–20)
BUN/CREAT SERPL: 17 (ref 12–20)
CALCIUM SERPL-MCNC: 7.1 MG/DL (ref 8.5–10.1)
CALCIUM SERPL-MCNC: 7.3 MG/DL (ref 8.5–10.1)
CALCIUM SERPL-MCNC: 7.5 MG/DL (ref 8.5–10.1)
CALCIUM SERPL-MCNC: 7.7 MG/DL (ref 8.5–10.1)
CALCIUM SERPL-MCNC: 9.1 MG/DL (ref 8.5–10.1)
CALCULATED P AXIS, ECG09: 77 DEGREES
CALCULATED R AXIS, ECG10: 77 DEGREES
CALCULATED T AXIS, ECG11: 52 DEGREES
CHLORIDE SERPL-SCNC: 107 MMOL/L (ref 100–108)
CHLORIDE SERPL-SCNC: 111 MMOL/L (ref 100–108)
CHLORIDE SERPL-SCNC: 113 MMOL/L (ref 100–108)
CHLORIDE SERPL-SCNC: 116 MMOL/L (ref 100–108)
CHLORIDE SERPL-SCNC: 90 MMOL/L (ref 100–108)
CK MB CFR SERPL CALC: NORMAL % (ref 0–4)
CK MB SERPL-MCNC: <1 NG/ML (ref 5–25)
CK SERPL-CCNC: 33 U/L (ref 26–192)
CO2 SERPL-SCNC: 12 MMOL/L (ref 21–32)
CO2 SERPL-SCNC: 15 MMOL/L (ref 21–32)
CO2 SERPL-SCNC: 17 MMOL/L (ref 21–32)
CO2 SERPL-SCNC: <5 MMOL/L (ref 21–32)
CO2 SERPL-SCNC: <5 MMOL/L (ref 21–32)
COLOR UR: YELLOW
CREAT SERPL-MCNC: 0.66 MG/DL (ref 0.6–1.3)
CREAT SERPL-MCNC: 0.78 MG/DL (ref 0.6–1.3)
CREAT SERPL-MCNC: 0.89 MG/DL (ref 0.6–1.3)
CREAT SERPL-MCNC: 1.12 MG/DL (ref 0.6–1.3)
CREAT SERPL-MCNC: 1.52 MG/DL (ref 0.6–1.3)
D50 ADMINISTERED, D50ADM: 0 ML
D50 ORDER, D50ORD: 0 ML
DIAGNOSIS, 93000: NORMAL
DIFFERENTIAL METHOD BLD: ABNORMAL
DIFFERENTIAL METHOD BLD: ABNORMAL
EOSINOPHIL # BLD: 0 K/UL (ref 0–0.4)
EOSINOPHIL # BLD: 0.2 K/UL (ref 0–0.4)
EOSINOPHIL NFR BLD: 0 % (ref 0–5)
EOSINOPHIL NFR BLD: 1 % (ref 0–5)
EPITH CASTS URNS QL MICRO: ABNORMAL /LPF (ref 0–5)
ERYTHROCYTE [DISTWIDTH] IN BLOOD BY AUTOMATED COUNT: 12.4 % (ref 11.6–14.5)
ERYTHROCYTE [DISTWIDTH] IN BLOOD BY AUTOMATED COUNT: 12.4 % (ref 11.6–14.5)
EST. AVERAGE GLUCOSE BLD GHB EST-MCNC: 269 MG/DL
GAS FLOW.O2 O2 DELIVERY SYS: ABNORMAL L/MIN
GAS FLOW.O2 O2 DELIVERY SYS: ABNORMAL L/MIN
GLOBULIN SER CALC-MCNC: 5.6 G/DL (ref 2–4)
GLUCOSE BLD STRIP.AUTO-MCNC: 100 MG/DL (ref 70–110)
GLUCOSE BLD STRIP.AUTO-MCNC: 105 MG/DL (ref 70–110)
GLUCOSE BLD STRIP.AUTO-MCNC: 105 MG/DL (ref 70–110)
GLUCOSE BLD STRIP.AUTO-MCNC: 114 MG/DL (ref 70–110)
GLUCOSE BLD STRIP.AUTO-MCNC: 124 MG/DL (ref 70–110)
GLUCOSE BLD STRIP.AUTO-MCNC: 138 MG/DL (ref 70–110)
GLUCOSE BLD STRIP.AUTO-MCNC: 157 MG/DL (ref 70–110)
GLUCOSE BLD STRIP.AUTO-MCNC: 158 MG/DL (ref 70–110)
GLUCOSE BLD STRIP.AUTO-MCNC: 181 MG/DL (ref 70–110)
GLUCOSE BLD STRIP.AUTO-MCNC: 182 MG/DL (ref 70–110)
GLUCOSE BLD STRIP.AUTO-MCNC: 196 MG/DL (ref 70–110)
GLUCOSE BLD STRIP.AUTO-MCNC: 199 MG/DL (ref 70–110)
GLUCOSE BLD STRIP.AUTO-MCNC: 206 MG/DL (ref 70–110)
GLUCOSE BLD STRIP.AUTO-MCNC: 218 MG/DL (ref 70–110)
GLUCOSE BLD STRIP.AUTO-MCNC: 222 MG/DL (ref 70–110)
GLUCOSE BLD STRIP.AUTO-MCNC: 229 MG/DL (ref 70–110)
GLUCOSE BLD STRIP.AUTO-MCNC: 248 MG/DL (ref 70–110)
GLUCOSE BLD STRIP.AUTO-MCNC: 312 MG/DL (ref 70–110)
GLUCOSE BLD STRIP.AUTO-MCNC: 413 MG/DL (ref 70–110)
GLUCOSE BLD STRIP.AUTO-MCNC: 480 MG/DL (ref 70–110)
GLUCOSE BLD STRIP.AUTO-MCNC: 571 MG/DL (ref 70–110)
GLUCOSE BLD STRIP.AUTO-MCNC: 592 MG/DL (ref 70–110)
GLUCOSE SERPL-MCNC: 157 MG/DL (ref 74–99)
GLUCOSE SERPL-MCNC: 167 MG/DL (ref 74–99)
GLUCOSE SERPL-MCNC: 335 MG/DL (ref 74–99)
GLUCOSE SERPL-MCNC: 580 MG/DL (ref 74–99)
GLUCOSE SERPL-MCNC: 87 MG/DL (ref 74–99)
GLUCOSE UR STRIP.AUTO-MCNC: >1000 MG/DL
GLUCOSE, GLC: 100 MG/DL
GLUCOSE, GLC: 105 MG/DL
GLUCOSE, GLC: 105 MG/DL
GLUCOSE, GLC: 114 MG/DL
GLUCOSE, GLC: 124 MG/DL
GLUCOSE, GLC: 138 MG/DL
GLUCOSE, GLC: 157 MG/DL
GLUCOSE, GLC: 158 MG/DL
GLUCOSE, GLC: 181 MG/DL
GLUCOSE, GLC: 189 MG/DL
GLUCOSE, GLC: 196 MG/DL
GLUCOSE, GLC: 199 MG/DL
GLUCOSE, GLC: 206 MG/DL
GLUCOSE, GLC: 218 MG/DL
GLUCOSE, GLC: 222 MG/DL
GLUCOSE, GLC: 229 MG/DL
GLUCOSE, GLC: 248 MG/DL
GLUCOSE, GLC: 312 MG/DL
GLUCOSE, GLC: 413 MG/DL
GLUCOSE, GLC: 480 MG/DL
GLUCOSE, GLC: 571 MG/DL
HBA1C MFR BLD: 11 % (ref 4.2–5.6)
HCG SERPL-ACNC: <1 MIU/ML (ref 0–10)
HCG UR QL: NEGATIVE
HCO3 BLD-SCNC: 1.8 MMOL/L (ref 22–26)
HCO3 BLD-SCNC: 4.9 MMOL/L (ref 22–26)
HCT VFR BLD AUTO: 41.6 % (ref 35–45)
HCT VFR BLD AUTO: 48.9 % (ref 35–45)
HGB BLD-MCNC: 14.3 G/DL (ref 12–16)
HGB BLD-MCNC: 16.3 G/DL (ref 12–16)
HGB UR QL STRIP: NEGATIVE
HIGH TARGET, HITG: 180 MG/DL
INSULIN ADMINSTERED, INSADM: 0.3 UNITS/HOUR
INSULIN ADMINSTERED, INSADM: 0.5 UNITS/HOUR
INSULIN ADMINSTERED, INSADM: 0.8 UNITS/HOUR
INSULIN ADMINSTERED, INSADM: 1 UNITS/HOUR
INSULIN ADMINSTERED, INSADM: 1.6 UNITS/HOUR
INSULIN ADMINSTERED, INSADM: 10.2 UNITS/HOUR
INSULIN ADMINSTERED, INSADM: 2 UNITS/HOUR
INSULIN ADMINSTERED, INSADM: 2 UNITS/HOUR
INSULIN ADMINSTERED, INSADM: 2.5 UNITS/HOUR
INSULIN ADMINSTERED, INSADM: 3.8 UNITS/HOUR
INSULIN ADMINSTERED, INSADM: 4 UNITS/HOUR
INSULIN ADMINSTERED, INSADM: 4.2 UNITS/HOUR
INSULIN ADMINSTERED, INSADM: 4.4 UNITS/HOUR
INSULIN ADMINSTERED, INSADM: 4.4 UNITS/HOUR
INSULIN ADMINSTERED, INSADM: 4.5 UNITS/HOUR
INSULIN ADMINSTERED, INSADM: 4.8 UNITS/HOUR
INSULIN ADMINSTERED, INSADM: 6.9 UNITS/HOUR
INSULIN ADMINSTERED, INSADM: 7.1 UNITS/HOUR
INSULIN ADMINSTERED, INSADM: 8.5 UNITS/HOUR
INSULIN ADMINSTERED, INSADM: 9.7 UNITS/HOUR
INSULIN ADMINSTERED, INSADM: 9.7 UNITS/HOUR
INSULIN ORDER, INSORD: 0.3 UNITS/HOUR
INSULIN ORDER, INSORD: 0.5 UNITS/HOUR
INSULIN ORDER, INSORD: 0.8 UNITS/HOUR
INSULIN ORDER, INSORD: 1 UNITS/HOUR
INSULIN ORDER, INSORD: 1.6 UNITS/HOUR
INSULIN ORDER, INSORD: 10.2 UNITS/HOUR
INSULIN ORDER, INSORD: 2 UNITS/HOUR
INSULIN ORDER, INSORD: 2 UNITS/HOUR
INSULIN ORDER, INSORD: 2.5 UNITS/HOUR
INSULIN ORDER, INSORD: 3.8 UNITS/HOUR
INSULIN ORDER, INSORD: 4 UNITS/HOUR
INSULIN ORDER, INSORD: 4.2 UNITS/HOUR
INSULIN ORDER, INSORD: 4.4 UNITS/HOUR
INSULIN ORDER, INSORD: 4.4 UNITS/HOUR
INSULIN ORDER, INSORD: 4.5 UNITS/HOUR
INSULIN ORDER, INSORD: 4.8 UNITS/HOUR
INSULIN ORDER, INSORD: 6.9 UNITS/HOUR
INSULIN ORDER, INSORD: 7.1 UNITS/HOUR
INSULIN ORDER, INSORD: 8.5 UNITS/HOUR
INSULIN ORDER, INSORD: 9.7 UNITS/HOUR
INSULIN ORDER, INSORD: 9.7 UNITS/HOUR
KETONES UR QL STRIP.AUTO: >160 MG/DL
LEUKOCYTE ESTERASE UR QL STRIP.AUTO: NEGATIVE
LOW TARGET, LOT: 140 MG/DL
LYMPHOCYTES # BLD: 1.9 K/UL (ref 0.8–3.5)
LYMPHOCYTES # BLD: 4.1 K/UL (ref 0.8–3.5)
LYMPHOCYTES NFR BLD: 18 % (ref 20–51)
LYMPHOCYTES NFR BLD: 9 % (ref 20–51)
MAGNESIUM SERPL-MCNC: 1.7 MG/DL (ref 1.6–2.6)
MAGNESIUM SERPL-MCNC: 2.3 MG/DL (ref 1.6–2.6)
MAGNESIUM SERPL-MCNC: 2.3 MG/DL (ref 1.6–2.6)
MAGNESIUM SERPL-MCNC: 2.6 MG/DL (ref 1.6–2.6)
MAGNESIUM SERPL-MCNC: 2.8 MG/DL (ref 1.6–2.6)
MCH RBC QN AUTO: 30.4 PG (ref 24–34)
MCH RBC QN AUTO: 30.6 PG (ref 24–34)
MCHC RBC AUTO-ENTMCNC: 33.3 G/DL (ref 31–37)
MCHC RBC AUTO-ENTMCNC: 34.4 G/DL (ref 31–37)
MCV RBC AUTO: 88.9 FL (ref 74–97)
MCV RBC AUTO: 91.1 FL (ref 74–97)
MINUTES UNTIL NEXT BG, NBG: 60 MIN
MONOCYTES # BLD: 1.2 K/UL (ref 0–1)
MONOCYTES # BLD: 1.4 K/UL (ref 0–1)
MONOCYTES NFR BLD: 6 % (ref 2–9)
MONOCYTES NFR BLD: 6 % (ref 2–9)
MULTIPLIER, MUL: 0.01
MULTIPLIER, MUL: 0.02
MULTIPLIER, MUL: 0.03
MULTIPLIER, MUL: 0.04
MULTIPLIER, MUL: 0.05
MULTIPLIER, MUL: 0.06
MULTIPLIER, MUL: 0.06
MULTIPLIER, MUL: 0.07
MULTIPLIER, MUL: 0.07
NEUTS BAND NFR BLD MANUAL: 13 % (ref 0–5)
NEUTS BAND NFR BLD MANUAL: 16 % (ref 0–5)
NEUTS SEG # BLD: 17.3 K/UL (ref 1.8–8)
NEUTS SEG # BLD: 17.6 K/UL (ref 1.8–8)
NEUTS SEG NFR BLD: 62 % (ref 42–75)
NEUTS SEG NFR BLD: 69 % (ref 42–75)
NITRITE UR QL STRIP.AUTO: NEGATIVE
O2/TOTAL GAS SETTING VFR VENT: 0.21 %
O2/TOTAL GAS SETTING VFR VENT: 21 %
ORDER INITIALS, ORDINIT: NORMAL
P-R INTERVAL, ECG05: 120 MS
PCO2 BLD: 15.9 MMHG (ref 35–45)
PCO2 BLD: 9.3 MMHG (ref 35–45)
PH BLD: 6.88 [PH] (ref 7.35–7.45)
PH BLD: 7.09 [PH] (ref 7.35–7.45)
PH UR STRIP: 5 [PH] (ref 5–8)
PHOSPHATE SERPL-MCNC: 1.1 MG/DL (ref 2.5–4.9)
PHOSPHATE SERPL-MCNC: 1.4 MG/DL (ref 2.5–4.9)
PHOSPHATE SERPL-MCNC: 2.4 MG/DL (ref 2.5–4.9)
PHOSPHATE SERPL-MCNC: 4.6 MG/DL (ref 2.5–4.9)
PHOSPHATE SERPL-MCNC: 9.3 MG/DL (ref 2.5–4.9)
PLATELET # BLD AUTO: 344 K/UL (ref 135–420)
PLATELET # BLD AUTO: 536 K/UL (ref 135–420)
PLATELET COMMENTS,PCOM: ABNORMAL
PLATELET COMMENTS,PCOM: ABNORMAL
PMV BLD AUTO: 8.9 FL (ref 9.2–11.8)
PMV BLD AUTO: 9 FL (ref 9.2–11.8)
PO2 BLD: 123 MMHG (ref 80–100)
PO2 BLD: 144 MMHG (ref 80–100)
POTASSIUM SERPL-SCNC: 3.5 MMOL/L (ref 3.5–5.5)
POTASSIUM SERPL-SCNC: 3.8 MMOL/L (ref 3.5–5.5)
POTASSIUM SERPL-SCNC: 3.8 MMOL/L (ref 3.5–5.5)
POTASSIUM SERPL-SCNC: 4 MMOL/L (ref 3.5–5.5)
POTASSIUM SERPL-SCNC: 4.8 MMOL/L (ref 3.5–5.5)
PROT SERPL-MCNC: 10.3 G/DL (ref 6.4–8.2)
PROT UR STRIP-MCNC: 30 MG/DL
Q-T INTERVAL, ECG07: 314 MS
QRS DURATION, ECG06: 88 MS
QTC CALCULATION (BEZET), ECG08: 445 MS
RBC # BLD AUTO: 4.68 M/UL (ref 4.2–5.3)
RBC # BLD AUTO: 5.37 M/UL (ref 4.2–5.3)
RBC #/AREA URNS HPF: ABNORMAL /HPF (ref 0–5)
RBC MORPH BLD: ABNORMAL
RBC MORPH BLD: ABNORMAL
SAO2 % BLD: 97 % (ref 92–97)
SAO2 % BLD: 97 % (ref 92–97)
SERVICE CMNT-IMP: ABNORMAL
SERVICE CMNT-IMP: ABNORMAL
SODIUM SERPL-SCNC: 129 MMOL/L (ref 136–145)
SODIUM SERPL-SCNC: 139 MMOL/L (ref 136–145)
SODIUM SERPL-SCNC: 139 MMOL/L (ref 136–145)
SODIUM SERPL-SCNC: 141 MMOL/L (ref 136–145)
SODIUM SERPL-SCNC: 142 MMOL/L (ref 136–145)
SP GR UR REFRACTOMETRY: 1.02 (ref 1–1.03)
SPECIMEN TYPE: ABNORMAL
SPECIMEN TYPE: ABNORMAL
TOTAL RESP. RATE, ITRR: 24
TOTAL RESP. RATE, ITRR: 36
TROPONIN I SERPL-MCNC: <0.02 NG/ML (ref 0–0.04)
UROBILINOGEN UR QL STRIP.AUTO: 0.2 EU/DL (ref 0.2–1)
VENTRICULAR RATE, ECG03: 121 BPM
WBC # BLD AUTO: 20.7 K/UL (ref 4.6–13.2)
WBC # BLD AUTO: 23 K/UL (ref 4.6–13.2)
WBC URNS QL MICRO: ABNORMAL /HPF (ref 0–5)
YEAST URNS QL MICRO: ABNORMAL

## 2018-11-16 PROCEDURE — 74011000258 HC RX REV CODE- 258: Performed by: PHYSICIAN ASSISTANT

## 2018-11-16 PROCEDURE — 94761 N-INVAS EAR/PLS OXIMETRY MLT: CPT

## 2018-11-16 PROCEDURE — 82962 GLUCOSE BLOOD TEST: CPT

## 2018-11-16 PROCEDURE — 74011000250 HC RX REV CODE- 250: Performed by: EMERGENCY MEDICINE

## 2018-11-16 PROCEDURE — 84100 ASSAY OF PHOSPHORUS: CPT

## 2018-11-16 PROCEDURE — 74011250636 HC RX REV CODE- 250/636: Performed by: INTERNAL MEDICINE

## 2018-11-16 PROCEDURE — 74011250637 HC RX REV CODE- 250/637: Performed by: PHYSICIAN ASSISTANT

## 2018-11-16 PROCEDURE — 99285 EMERGENCY DEPT VISIT HI MDM: CPT

## 2018-11-16 PROCEDURE — 74011636637 HC RX REV CODE- 636/637: Performed by: EMERGENCY MEDICINE

## 2018-11-16 PROCEDURE — 65610000006 HC RM INTENSIVE CARE

## 2018-11-16 PROCEDURE — 83036 HEMOGLOBIN GLYCOSYLATED A1C: CPT

## 2018-11-16 PROCEDURE — 83735 ASSAY OF MAGNESIUM: CPT

## 2018-11-16 PROCEDURE — 74011000250 HC RX REV CODE- 250

## 2018-11-16 PROCEDURE — 82553 CREATINE MB FRACTION: CPT

## 2018-11-16 PROCEDURE — 74011000250 HC RX REV CODE- 250: Performed by: PHYSICIAN ASSISTANT

## 2018-11-16 PROCEDURE — 80048 BASIC METABOLIC PNL TOTAL CA: CPT

## 2018-11-16 PROCEDURE — 81025 URINE PREGNANCY TEST: CPT

## 2018-11-16 PROCEDURE — 81001 URINALYSIS AUTO W/SCOPE: CPT

## 2018-11-16 PROCEDURE — 74011000258 HC RX REV CODE- 258: Performed by: EMERGENCY MEDICINE

## 2018-11-16 PROCEDURE — 36600 WITHDRAWAL OF ARTERIAL BLOOD: CPT

## 2018-11-16 PROCEDURE — 74011636637 HC RX REV CODE- 636/637: Performed by: INTERNAL MEDICINE

## 2018-11-16 PROCEDURE — 77030037878 HC DRSG MEPILEX >48IN BORD MOLN -B

## 2018-11-16 PROCEDURE — 85025 COMPLETE CBC W/AUTO DIFF WBC: CPT

## 2018-11-16 PROCEDURE — 82803 BLOOD GASES ANY COMBINATION: CPT

## 2018-11-16 PROCEDURE — 93005 ELECTROCARDIOGRAM TRACING: CPT

## 2018-11-16 PROCEDURE — 96361 HYDRATE IV INFUSION ADD-ON: CPT

## 2018-11-16 PROCEDURE — 84702 CHORIONIC GONADOTROPIN TEST: CPT

## 2018-11-16 PROCEDURE — 74011250636 HC RX REV CODE- 250/636: Performed by: PHYSICIAN ASSISTANT

## 2018-11-16 PROCEDURE — 96374 THER/PROPH/DIAG INJ IV PUSH: CPT

## 2018-11-16 PROCEDURE — 74011250636 HC RX REV CODE- 250/636: Performed by: EMERGENCY MEDICINE

## 2018-11-16 PROCEDURE — 80053 COMPREHEN METABOLIC PANEL: CPT

## 2018-11-16 PROCEDURE — 74011000250 HC RX REV CODE- 250: Performed by: INTERNAL MEDICINE

## 2018-11-16 PROCEDURE — 77030037875 HC DRSG MEPILEX <16IN BORD MOLN -A

## 2018-11-16 PROCEDURE — 74011000258 HC RX REV CODE- 258: Performed by: INTERNAL MEDICINE

## 2018-11-16 PROCEDURE — 74011250636 HC RX REV CODE- 250/636: Performed by: NURSE PRACTITIONER

## 2018-11-16 PROCEDURE — 36415 COLL VENOUS BLD VENIPUNCTURE: CPT

## 2018-11-16 PROCEDURE — 71045 X-RAY EXAM CHEST 1 VIEW: CPT

## 2018-11-16 RX ORDER — DOCUSATE SODIUM 100 MG/1
100 CAPSULE, LIQUID FILLED ORAL
Status: DISCONTINUED | OUTPATIENT
Start: 2018-11-16 | End: 2018-11-18 | Stop reason: HOSPADM

## 2018-11-16 RX ORDER — DEXTROSE 50 % IN WATER (D50W) INTRAVENOUS SYRINGE
25-50 AS NEEDED
Status: DISCONTINUED | OUTPATIENT
Start: 2018-11-16 | End: 2018-11-18 | Stop reason: HOSPADM

## 2018-11-16 RX ORDER — POTASSIUM CHLORIDE 7.45 MG/ML
10 INJECTION INTRAVENOUS
Status: COMPLETED | OUTPATIENT
Start: 2018-11-16 | End: 2018-11-16

## 2018-11-16 RX ORDER — ENOXAPARIN SODIUM 100 MG/ML
40 INJECTION SUBCUTANEOUS EVERY 24 HOURS
Status: DISCONTINUED | OUTPATIENT
Start: 2018-11-16 | End: 2018-11-16

## 2018-11-16 RX ORDER — ENOXAPARIN SODIUM 100 MG/ML
40 INJECTION SUBCUTANEOUS EVERY 24 HOURS
Status: DISCONTINUED | OUTPATIENT
Start: 2018-11-16 | End: 2018-11-18 | Stop reason: HOSPADM

## 2018-11-16 RX ORDER — POTASSIUM CHLORIDE AND SODIUM CHLORIDE 900; 300 MG/100ML; MG/100ML
INJECTION, SOLUTION INTRAVENOUS ONCE
Status: DISCONTINUED | OUTPATIENT
Start: 2018-11-16 | End: 2018-11-16

## 2018-11-16 RX ORDER — FAMOTIDINE 20 MG/50ML
20 INJECTION, SOLUTION INTRAVENOUS EVERY 12 HOURS
Status: DISCONTINUED | OUTPATIENT
Start: 2018-11-16 | End: 2018-11-16 | Stop reason: RX

## 2018-11-16 RX ORDER — OXYCODONE AND ACETAMINOPHEN 5; 325 MG/1; MG/1
1 TABLET ORAL
Status: DISCONTINUED | OUTPATIENT
Start: 2018-11-16 | End: 2018-11-16

## 2018-11-16 RX ORDER — NALOXONE HYDROCHLORIDE 0.4 MG/ML
0.4 INJECTION, SOLUTION INTRAMUSCULAR; INTRAVENOUS; SUBCUTANEOUS AS NEEDED
Status: DISCONTINUED | OUTPATIENT
Start: 2018-11-16 | End: 2018-11-18 | Stop reason: HOSPADM

## 2018-11-16 RX ORDER — ONDANSETRON 2 MG/ML
4 INJECTION INTRAMUSCULAR; INTRAVENOUS
Status: DISCONTINUED | OUTPATIENT
Start: 2018-11-16 | End: 2018-11-18 | Stop reason: HOSPADM

## 2018-11-16 RX ORDER — MAGNESIUM SULFATE HEPTAHYDRATE 40 MG/ML
2 INJECTION, SOLUTION INTRAVENOUS ONCE
Status: COMPLETED | OUTPATIENT
Start: 2018-11-16 | End: 2018-11-16

## 2018-11-16 RX ORDER — POTASSIUM CHLORIDE AND SODIUM CHLORIDE 900; 300 MG/100ML; MG/100ML
INJECTION, SOLUTION INTRAVENOUS ONCE
Status: COMPLETED | OUTPATIENT
Start: 2018-11-16 | End: 2018-11-16

## 2018-11-16 RX ORDER — MAGNESIUM SULFATE 100 %
4 CRYSTALS MISCELLANEOUS AS NEEDED
Status: DISCONTINUED | OUTPATIENT
Start: 2018-11-16 | End: 2018-11-18 | Stop reason: HOSPADM

## 2018-11-16 RX ORDER — SODIUM BICARBONATE 1 MEQ/ML
50 SYRINGE (ML) INTRAVENOUS ONCE
Status: COMPLETED | OUTPATIENT
Start: 2018-11-16 | End: 2018-11-16

## 2018-11-16 RX ORDER — DEXTROSE MONOHYDRATE AND SODIUM CHLORIDE 5; .45 G/100ML; G/100ML
125 INJECTION, SOLUTION INTRAVENOUS CONTINUOUS
Status: DISCONTINUED | OUTPATIENT
Start: 2018-11-16 | End: 2018-11-17

## 2018-11-16 RX ORDER — ACETAMINOPHEN 325 MG/1
650 TABLET ORAL
Status: DISCONTINUED | OUTPATIENT
Start: 2018-11-16 | End: 2018-11-18 | Stop reason: HOSPADM

## 2018-11-16 RX ORDER — SODIUM CHLORIDE 9 MG/ML
3000 INJECTION, SOLUTION INTRAVENOUS ONCE
Status: COMPLETED | OUTPATIENT
Start: 2018-11-16 | End: 2018-11-16

## 2018-11-16 RX ORDER — ACETAMINOPHEN 650 MG/1
650 SUPPOSITORY RECTAL
Status: DISCONTINUED | OUTPATIENT
Start: 2018-11-16 | End: 2018-11-18 | Stop reason: HOSPADM

## 2018-11-16 RX ORDER — HEPARIN SODIUM 5000 [USP'U]/ML
4000 INJECTION, SOLUTION INTRAVENOUS; SUBCUTANEOUS EVERY 12 HOURS
Status: DISCONTINUED | OUTPATIENT
Start: 2018-11-16 | End: 2018-11-16

## 2018-11-16 RX ADMIN — POTASSIUM CHLORIDE 10 MEQ: 10 INJECTION, SOLUTION INTRAVENOUS at 20:18

## 2018-11-16 RX ADMIN — LIDOCAINE HYDROCHLORIDE: 10 INJECTION, SOLUTION EPIDURAL; INFILTRATION; INTRACAUDAL; PERINEURAL at 02:50

## 2018-11-16 RX ADMIN — ENOXAPARIN SODIUM 40 MG: 100 INJECTION SUBCUTANEOUS at 11:40

## 2018-11-16 RX ADMIN — SODIUM CHLORIDE 4.5 UNITS/HR: 900 INJECTION, SOLUTION INTRAVENOUS at 23:39

## 2018-11-16 RX ADMIN — POTASSIUM CHLORIDE 10 MEQ: 10 INJECTION, SOLUTION INTRAVENOUS at 18:26

## 2018-11-16 RX ADMIN — DEXTROSE MONOHYDRATE AND SODIUM CHLORIDE 150 ML/HR: 5; .45 INJECTION, SOLUTION INTRAVENOUS at 08:09

## 2018-11-16 RX ADMIN — ACETAMINOPHEN 650 MG: 325 TABLET ORAL at 08:40

## 2018-11-16 RX ADMIN — LIDOCAINE HYDROCHLORIDE: 10 INJECTION, SOLUTION EPIDURAL; INFILTRATION; INTRACAUDAL; PERINEURAL at 04:03

## 2018-11-16 RX ADMIN — ACETAMINOPHEN 650 MG: 325 TABLET ORAL at 18:25

## 2018-11-16 RX ADMIN — SODIUM CHLORIDE 10.2 UNITS/HR: 900 INJECTION, SOLUTION INTRAVENOUS at 02:20

## 2018-11-16 RX ADMIN — HEPARIN SODIUM 4000 UNITS: 5000 INJECTION, SOLUTION INTRAVENOUS; SUBCUTANEOUS at 05:20

## 2018-11-16 RX ADMIN — POTASSIUM CHLORIDE AND SODIUM CHLORIDE: 900; 300 INJECTION, SOLUTION INTRAVENOUS at 11:39

## 2018-11-16 RX ADMIN — MAGNESIUM SULFATE HEPTAHYDRATE 2 G: 40 INJECTION, SOLUTION INTRAVENOUS at 07:55

## 2018-11-16 RX ADMIN — FAMOTIDINE 20 MG: 10 INJECTION, SOLUTION INTRAVENOUS at 08:42

## 2018-11-16 RX ADMIN — DEXTROSE MONOHYDRATE AND SODIUM CHLORIDE 150 ML/HR: 5; .45 INJECTION, SOLUTION INTRAVENOUS at 14:16

## 2018-11-16 RX ADMIN — POTASSIUM PHOSPHATE, MONOBASIC AND POTASSIUM PHOSPHATE, DIBASIC: 224; 236 INJECTION, SOLUTION INTRAVENOUS at 15:21

## 2018-11-16 RX ADMIN — LIDOCAINE HYDROCHLORIDE: 10 INJECTION, SOLUTION EPIDURAL; INFILTRATION; INTRACAUDAL; PERINEURAL at 05:13

## 2018-11-16 RX ADMIN — SODIUM CHLORIDE 1.6 UNITS/HR: 900 INJECTION, SOLUTION INTRAVENOUS at 16:19

## 2018-11-16 RX ADMIN — SODIUM BICARBONATE: 84 INJECTION, SOLUTION INTRAVENOUS at 02:46

## 2018-11-16 RX ADMIN — DEXTROSE MONOHYDRATE AND SODIUM CHLORIDE 150 ML/HR: 5; .45 INJECTION, SOLUTION INTRAVENOUS at 20:30

## 2018-11-16 RX ADMIN — SODIUM CHLORIDE 1000 ML: 900 INJECTION, SOLUTION INTRAVENOUS at 01:15

## 2018-11-16 RX ADMIN — SODIUM CHLORIDE 3000 ML: 900 INJECTION, SOLUTION INTRAVENOUS at 02:37

## 2018-11-16 RX ADMIN — SODIUM BICARBONATE 50 MEQ: 84 INJECTION, SOLUTION INTRAVENOUS at 02:22

## 2018-11-16 NOTE — CONSULTS
[facility]  Pulmonary, Critical Care, and Sleep Medicine      Name: Hallie Raza MRN: [id number]   : 1995 Hospital: [facility]   Date: 2018          Critical Care Initial Patient Consult    Requesting MD:  Grupo                                                Reason for CC Consult: DKA    IMPRESSION:   · DKA with Profound Metabolic Acidosis - Initial serum . PH:6.84  · Severe Metabolic Acidosis - 2/2 above; Con't Bicarb gtt and Fluid resuscitation. · Elevated Cr - Likely 2/2 above. Con't fluid resuscitation. · Leukocytosis with Bandemia and L shift - WBC: 23K - Reactive vs. Infection. Afebrile. Will trend. · Hx of DM - Hgb A1C - 11.0. States she uses a disposable insulin pump at home. Will need diabetic education prior to D/C.       RECOMMENDATIONS:   · Resp - Comfortable on RA; supp O2 PRN for SpO2 >94%; aspiration precautions - HOB >30'. Repeat ABG at 0400. Monitor for respiratory decompensation due to acidosis. · ID - Afebrile; elevated WBC with 13 bands and L shift. Monitor closely. Reactive vs. Infection. Obtain BxCx, will hold on ABX for now. Trend temp curve and WBC curve. Check LA. · CVS - HD stable currently. Goal MAP >65. Con't fluid resuscitation. Troponin (-). · Heme/Onc - Daily CBC; monitor H/H & Plts. No current issues  · Metabolic - Check CMP this AM. q4 BMP, Mag & Phos. Con't Bicarb gtt with 2amps @250cc/hr for now. Complete 2L NS bolus. Will switch to [facility]. IVF thereafter. Receiving 3 runs K+. Closely monitor e-lytes; replace PRN. · Renal - Trend renal indices; Strict I/O's; monitor UOP. Cr currently slightly elevated   · Endocrine - Glycemic control goal <180. Cont' Insulin gtt per Glucostabilizer protocol. POC glucose q1 hr while on drip. · Neuro/ Pain/ Sedation - PRN Tylenol; Con't home Prozac when able to PO. Avoid sedation  · GI - NPO currently. Pepcid; Zofran PRN for nausea.    · Prophylaxis - DVT (SQH), GI (Pepcid) Subjective/History: This patient has been seen and evaluated at the request of Dr. Rod Kelly for DKA. Patient is a 21 y.o. female with PMH of DM, Depression and Anxiety who presented to [facility] ED on 11/16/18 c/o N/V, weakness and fast HR. Pt appears ill and thin. Upon arrival to ED, labs revealed that pt was in DKA with serum BS of 580, pH:6.884 pCO2: 9.3 pO2:144 and HCO3: 1.8. Pt received 2 amps bicarb; Bicarb gtt started at 250cc/Hr; 2L NS currently running and Insulin gtt started. K+ currently is 4.8, however 3 runs of K+ were ordered in anticipation of e-lyte shift with IVF. Pt does note that she recently had a \"sinus infection, but resolved with OTC meds about a week ago\". Pt states that symptoms occurred suddenly this evening, and stated that she initially \"just didn't feel well\". Per chart, pt's presenting sx's to ED were worsening dizziness.  at bedside. Pt denies ever having been in DKA before; denies any previous hospitalizations for her Diabetes. No sick contacts. Pt uses an insulin pump at home - disposable; states she checks her BS daily in the morning. Currently, pt states she feels slightly SOB and is mildly nauseated. Denies any KINSEY, CP, abd pain, Diarrhea, dysuria, H/A, blurry vision, F/C. Pt admitted to ICU for further management. Past Medical History:   Diagnosis Date    Depression     Anxiety    Diabetes ([facility] [address]       History reviewed. No pertinent surgical history. Prior to Admission medications    Medication Sig Start Date End Date Taking? Authorizing Provider   oxyCODONE-acetaminophen (PERCOCET 7.5) 7.5-325 mg per tablet Take 1 Tab by mouth every four (4) hours as needed for Pain. Max Daily Amount: 6 Tabs. 8/17/17   Amy Horn MD   ondansetron (ZOFRAN ODT) 4 mg disintegrating tablet Take 1 Tab by mouth every eight (8) hours as needed for Nausea. 8/17/17   Amy Horn MD    insulin pump (PATIENT SUPPLIED) misc by SubCUTAneous route as needed.     Provider, Historical FLUoxetine (PROZAC) 20 mg capsule Take 20 mg by mouth daily. 17   Provider, Historical   lisinopril (PRINIVIL, ZESTRIL) 2.5 mg tablet Take  by mouth daily. Alanna, MD Marylu   norethindrone-ethinyl estradiol (JUNEL 1/20, 21,) 1-20 mg-mcg tab Take  by mouth. Other, MD Marylu     Current Facility-Administered Medications   Medication Dose Route Frequency    potassium chloride 10 mEq, lidocaine (PF) (XYLOCAINE) 10 mg/mL (1 %) 1 mL in 0.9% sodium chloride 100 mL IVPB   IntraVENous Q1H    sodium bicarbonate (8.4%) 100 mEq in sterile water (preservative free) 400 mL IVPB   IntraVENous ONCE    heparin (porcine) injection 4,000 Units  4,000 Units SubCUTAneous Q12H    insulin regular (NOVOLIN,HUMULIN) 100 units/100 ml NS infusion (premix)  0-50 Units/hr IntraVENous TITRATE     No Known Allergies   Social History     Tobacco Use    Smoking status: Never Smoker    Smokeless tobacco: Never Used   Substance Use Topics    Alcohol use: No      History reviewed. No pertinent family history. Review of Systems:  Pertinent items are noted in HPI. Objective:   Vital Signs:    Visit Vitals  /74   Pulse (!) 135   Temp 98.4 °F (36.9 °C)   Resp (!) 32   Wt 52.2 kg (115 lb)   SpO2 100%   BMI 17.49 kg/m²               Temp (24hrs), Av.4 °F (36.9 °C), Min:98.4 °F (36.9 °C), Max:98.4 °F (36.9 °C)       Intake/Output:   Last shift:      No intake/output data recorded. Last 3 shifts: No intake/output data recorded. No intake or output data in the 24 hours ending 18 0303      Physical Exam:    General:  Alert, cooperative,thin; shivering; NAD, appears stated age. Head:  Normocephalic, without obvious abnormality, atraumatic. Eyes:  Conjunctivae/corneas clear. PERRL, EOMs intact. Nose: Nares normal. Septum midline. Mucosa normal. No drainage or sinus tenderness. Throat: Lips, mucosa, and tongue normal. Teeth and gums normal.   Neck: Supple, symmetrical, trachea midline, No JVD.    Lungs: Symmetrical chest rise; good AE bilat; CTAB; No wheezes/rhonchi/rales noted. Heart:  Tachycardiac; RR, S1, S2 normal, no m/r/g   Abdomen:   Soft, non-tender. Bowel sounds normal. No masses,  No organomegaly. Extremities: Extremities normal, atraumatic, no cyanosis or edema. Pulses: 2+ and symmetric all extremities. Skin: Skin color, texture, turgor normal. No rashes or lesions   Neurologic: Grossly nonfocal       Data:     Recent Results (from the past 24 hour(s))   GLUCOSE, POC    Collection Time: 11/16/18 12:37 AM   Result Value Ref Range    Glucose (POC) 592 (HH) 70 - 110 mg/dL   CBC WITH AUTOMATED DIFF    Collection Time: 11/16/18  1:00 AM   Result Value Ref Range    WBC 23.0 (H) 4.6 - 13.2 K/uL    RBC 5.37 (H) 4.20 - 5.30 M/uL    HGB 16.3 (H) 12.0 - 16.0 g/dL    HCT 48.9 (H) 35.0 - 45.0 %    MCV 91.1 74.0 - 97.0 FL    MCH 30.4 24.0 - 34.0 PG    MCHC 33.3 31.0 - 37.0 g/dL    RDW 12.4 11.6 - 14.5 %    PLATELET 093 (H) 195 - 420 K/uL    MPV 9.0 (L) 9.2 - 11.8 FL    NEUTROPHILS 62 42 - 75 %    BAND NEUTROPHILS 13 (H) 0 - 5 %    LYMPHOCYTES 18 (L) 20 - 51 %    MONOCYTES 6 2 - 9 %    EOSINOPHILS 1 0 - 5 %    BASOPHILS 0 0 - 3 %    ABS. NEUTROPHILS 17.3 (H) 1.8 - 8.0 K/UL    ABS. LYMPHOCYTES 4.1 (H) 0.8 - 3.5 K/UL    ABS. MONOCYTES 1.4 (H) 0 - 1.0 K/UL    ABS. EOSINOPHILS 0.2 0.0 - 0.4 K/UL    ABS.  BASOPHILS 0.0 0.0 - 0.06 K/UL    DF MANUAL      PLATELET COMMENTS Increased Platelets      RBC COMMENTS NORMOCYTIC, NORMOCHROMIC     METABOLIC PANEL, COMPREHENSIVE    Collection Time: 11/16/18  1:00 AM   Result Value Ref Range    Sodium 129 (L) 136 - 145 mmol/L    Potassium 4.8 3.5 - 5.5 mmol/L    Chloride 90 (L) 100 - 108 mmol/L    CO2 <5 (LL) 21 - 32 mmol/L    Anion gap Cannot be calculated 3.0 - 18 mmol/L    Glucose 580 (HH) 74 - 99 mg/dL    BUN 20 (H) 7.0 - 18 MG/DL    Creatinine 1.52 (H) 0.6 - 1.3 MG/DL    BUN/Creatinine ratio 13 12 - 20      GFR est AA 51 (L) >60 ml/min/1.73m2    GFR est non-AA 42 (L) >60 ml/min/1.73m2    Calcium 9.1 8.5 - 10.1 MG/DL    Bilirubin, total 0.4 0.2 - 1.0 MG/DL    ALT (SGPT) 18 13 - 56 U/L    AST (SGOT) 13 (L) 15 - 37 U/L    Alk. phosphatase 154 (H) 45 - 117 U/L    Protein, total 10.3 (H) 6.4 - 8.2 g/dL    Albumin 4.7 3.4 - 5.0 g/dL    Globulin 5.6 (H) 2.0 - 4.0 g/dL    A-G Ratio 0.8 0.8 - 1.7     CARDIAC PANEL,(CK, CKMB & TROPONIN)    Collection Time: 11/16/18  1:00 AM   Result Value Ref Range    CK 33 26 - 192 U/L    CK - MB <1.0 <3.6 ng/ml    CK-MB Index  0.0 - 4.0 %     CALCULATION NOT PERFORMED WHEN RESULT IS BELOW LINEAR LIMIT    Troponin-I, Qt. <0.02 0.0 - 0.045 NG/ML   MAGNESIUM    Collection Time: 11/16/18  1:00 AM   Result Value Ref Range    Magnesium 2.3 1.6 - 2.6 mg/dL   HEMOGLOBIN A1C WITH EAG    Collection Time: 11/16/18  1:00 AM   Result Value Ref Range    Hemoglobin A1c 11.0 (H) 4.2 - 5.6 %    Est. average glucose 269 mg/dL   EKG, 12 LEAD, INITIAL    Collection Time: 11/16/18  1:26 AM   Result Value Ref Range    Ventricular Rate 121 BPM    Atrial Rate 121 BPM    P-R Interval 120 ms    QRS Duration 88 ms    Q-T Interval 314 ms    QTC Calculation (Bezet) 445 ms    Calculated P Axis 77 degrees    Calculated R Axis 77 degrees    Calculated T Axis 52 degrees    Diagnosis       Sinus tachycardia  Biatrial enlargement  Abnormal ECG  No previous ECGs available     GLUCOSE, POC    Collection Time: 11/16/18  2:13 AM   Result Value Ref Range    Glucose (POC) 571 (HH) 70 - 110 mg/dL   GLUCOSTABILIZER    Collection Time: 11/16/18  2:16 AM   Result Value Ref Range    Glucose 571 mg/dL    Insulin order 10.2 units/hour    Insulin adminstered 10.2 units/hour    Multiplier 0.020     Low target 140 mg/dL    High target 180 mg/dL    D50 order 0.0 ml    D50 administered 0.00 ml    Minutes until next BG 60 min    Order initials CKA     Administered initials CKA              Telemetry:normal sinus rhythm, Tachycardiac    Imaging:  CXR [11/16/18]:   Pending Read    I have personally reviewed the patients radiographs and have reviewed the reports:  AMD        Total critical care time exclusive of procedures: 60 minutes    Pineda Duval PA-C    Pulmonary [facility]        [facility] Staff Addendum     I have independently evaluated the patient and reviewed the patient's chart. I have discussed the findings and care plan with ICU Care Team. Care Plan reviewed on ICU milti-D rounds. I agree with the above evaluation, assessment and recommendations along with the following comments and observations. Patient reports a sinus infection that started towards the end of October and recently resolved with OTC therapy. She reports having an uncomplicated crown placed right jaw earlier this week- She was told that there was inflammation but no infection. She has some mild tenderness over site but does not looked inflamed on exam. She reports that she has a chronically stressed life style and there has been no change from her baseline. Her last emesis was yesterday. She reports feeling sore in her chest wall and abdomen. Exam benign at this time. She has not had any recent weight loss but states she lost 25 pounds when she was initially diagnosed with DM at the age of 25. She uses a disposable insulin pump. The manual switch should release 2 units per click but she reports she has been having issues with the device sticking. Unclear if she has been receiving extra insulin. Still on insulin drip. Continuing with supportive care. Transition off Insulin drip per protocol. Patient has her pump and supplies at bedside.      Critical  Care and time spent coordinating care, minus procedure time: 40  min    Tanna Rodriguez DO, [facility]  Pulmonary, Sleep and Critical Care Medicine  5:25 PM

## 2018-11-16 NOTE — ED PROVIDER NOTES
EMERGENCY DEPARTMENT HISTORY AND PHYSICAL EXAM 
 
1:07 AM 
 
 
Date: 11/16/2018 Patient Name: Emmy Brooks History of Presenting Illness Chief Complaint Patient presents with  Shortness of Breath  Dizziness History Provided By: Patient and Patient's  Chief Complaint: Dizziness Duration: 4 Hours Timing:  Constant and Worsening Location: NA 
Quality: NA Severity: Moderate Modifying Factors: None Associated Symptoms: generalized weakness Additional History (Context): Emmy Brooks is a 21 y.o. female with PMHx of diabetes and depression who presents with c/o moderate constant and worsening dizziness that started 4 hours ago with associated Sx of generalized weakness. Pt's  states the patient wasn't feeling well when he left for work this morning but by the time he arrived home from work 4 hours ago, the patient's Sx had worsened. Pt states \"I don't feel good\". Pt has a hx of diabetes and has an insulin pump. Pt's blood sugar was 595 in the ED. Pt's  states the patient had a sinus infection 2 weeks ago. They states she is not pregnant that they know of. Denies any further complaints or symptoms at the moment. PCP: Alanna, MD Marylu 
 
Current Facility-Administered Medications Medication Dose Route Frequency Provider Last Rate Last Dose  potassium chloride 10 mEq, lidocaine (PF) (XYLOCAINE) 10 mg/mL (1 %) 1 mL in 0.9% sodium chloride 100 mL IVPB   IntraVENous Q1H Gobraeel, Isa Lesch, MD      
 glucose chewable tablet 16 g  4 Tab Oral PRN Mo Sesay MD      
 glucagon (GLUCAGEN) injection 1 mg  1 mg IntraMUSCular PRN Mo Sesay MD      
 dextrose (D50W) injection syrg 12.5-25 g  25-50 mL IntraVENous PRN Mo Sesay MD      
 sodium bicarbonate (8.4%) 100 mEq in sterile water (preservative free) 400 mL IVPB   IntraVENous ONCE Dates, Trisha Mooney MD      
 naloxone [facility]) injection 0.4 mg  0.4 mg IntraVENous PRN Golucho, Beatriz Mahan MD      
 ondansetron [facility]) injection 4 mg  4 mg IntraVENous Q6H PRN Ashley Dunbar MD      
 docusate sodium (COLACE) capsule 100 mg  100 mg Oral BID PRN Ashley Dunbar MD      
 acetaminophen (TYLENOL) suppository 650 mg  650 mg Rectal Q4H PRN Ashley Dunbar MD      
 oxyCODONE-acetaminophen (PERCOCET) 5-325 mg per tablet 1 Tab  1 Tab Oral Q6H PRN Ashley Dunbar MD      
 heparin (porcine) injection 4,000 Units  4,000 Units SubCUTAneous Q12H Beatriz Chaparro MD      
 insulin regular (NOVOLIN,HUMULIN) 100 units/100 ml NS infusion (premix)  0-50 Units/hr IntraVENous TITRATE Ashley Dunbar MD      
 
Current Outpatient Medications Medication Sig Dispense Refill  oxyCODONE-acetaminophen (PERCOCET 7.5) 7.5-325 mg per tablet Take 1 Tab by mouth every four (4) hours as needed for Pain. Max Daily Amount: 6 Tabs. 36 Tab 0  
 ondansetron (ZOFRAN ODT) 4 mg disintegrating tablet Take 1 Tab by mouth every eight (8) hours as needed for Nausea. 6 Tab 0  
  insulin pump (PATIENT SUPPLIED) misc by SubCUTAneous route as needed.  FLUoxetine (PROZAC) 20 mg capsule Take 20 mg by mouth daily. 10  
 lisinopril (PRINIVIL, ZESTRIL) 2.5 mg tablet Take  by mouth daily.  norethindrone-ethinyl estradiol (JUNEL 1/20, 21,) 1-20 mg-mcg tab Take  by mouth. Past History Past Medical History: 
Past Medical History:  
Diagnosis Date  Depression Anxiety  Diabetes ([facility] [address] Past Surgical History: 
History reviewed. No pertinent surgical history. Family History: 
History reviewed. No pertinent family history. Social History: 
Social History Tobacco Use  Smoking status: Never Smoker  Smokeless tobacco: Never Used Substance Use Topics  Alcohol use: No  
 Drug use: No  
 
 
Allergies: 
No Known Allergies Review of Systems Review of Systems Neurological: Positive for dizziness. Negative for syncope. All other systems reviewed and are negative. Physical Exam  
 
Visit Vitals /88 Pulse (!) 124 Temp 98.4 °F (36.9 °C) Resp 30 Wt 52.2 kg (115 lb) SpO2 100% BMI 17.49 kg/m² Physical Exam  
Cardiovascular: Tachycardia present. Abdominal:  
Insulin pump on left side of abdominal wall Skin: There is pallor. Physical Exam: 
. Patient Vitals for the past 12 hrs: 
 Temp Pulse Resp BP SpO2  
11/16/18 0115  (!) 124 30 146/88 100 % 11/16/18 0043 98.4 °F (36.9 °C) (!) 123 (!) 32 123/64 96 % Gen: Well developed, well nourished 21 y.o. female HEENT: Normocephalic, atraumatic Respiratory: No accessory muscle use No wheeze, No rales, No rhonchi. Normal chest wall excursion. No subcutaneous air, no rib crepitus Gastrointestinal: Non distended, Non tender, No masses. No ascites. No organomegaly. No evidence of trauma Musculoskeletal: Full range of motion at all other tested joints. No joint effusions. Neuro: Normal strength, Normal sensation. Normal speech. No ataxia. Cranial Nerves II-XII normal as tested. Skin: No rash, petechia or purpura. Warm and dry Psyche: No suicidal ideation, No homicidal ideation. No hallucinations. Organized thoughts. Heme: Normal 
: Deferred Diagnostic Study Results Labs - Recent Results (from the past 12 hour(s)) GLUCOSE, POC Collection Time: 11/16/18 12:37 AM  
Result Value Ref Range Glucose (POC) 592 (HH) 70 - 110 mg/dL CBC WITH AUTOMATED DIFF Collection Time: 11/16/18  1:00 AM  
Result Value Ref Range WBC 23.0 (H) 4.6 - 13.2 K/uL  
 RBC 5.37 (H) 4.20 - 5.30 M/uL  
 HGB 16.3 (H) 12.0 - 16.0 g/dL HCT 48.9 (H) 35.0 - 45.0 % MCV 91.1 74.0 - 97.0 FL  
 MCH 30.4 24.0 - 34.0 PG  
 MCHC 33.3 31.0 - 37.0 g/dL  
 RDW 12.4 11.6 - 14.5 % PLATELET 701 (H) 516 - 420 K/uL MPV 9.0 (L) 9.2 - 11.8 FL  
 NEUTROPHILS 62 42 - 75 % BAND NEUTROPHILS 13 (H) 0 - 5 % LYMPHOCYTES 18 (L) 20 - 51 % MONOCYTES 6 2 - 9 % EOSINOPHILS 1 0 - 5 % BASOPHILS 0 0 - 3 %  
 ABS. NEUTROPHILS 17.3 (H) 1.8 - 8.0 K/UL  
 ABS. LYMPHOCYTES 4.1 (H) 0.8 - 3.5 K/UL  
 ABS. MONOCYTES 1.4 (H) 0 - 1.0 K/UL  
 ABS. EOSINOPHILS 0.2 0.0 - 0.4 K/UL  
 ABS. BASOPHILS 0.0 0.0 - 0.06 K/UL  
 DF MANUAL PLATELET COMMENTS Increased Platelets RBC COMMENTS NORMOCYTIC, NORMOCHROMIC METABOLIC PANEL, COMPREHENSIVE Collection Time: 11/16/18  1:00 AM  
Result Value Ref Range Sodium 129 (L) 136 - 145 mmol/L Potassium 4.8 3.5 - 5.5 mmol/L Chloride 90 (L) 100 - 108 mmol/L  
 CO2 <5 (LL) 21 - 32 mmol/L Anion gap Cannot be calculated 3.0 - 18 mmol/L Glucose 580 (HH) 74 - 99 mg/dL BUN 20 (H) 7.0 - 18 MG/DL Creatinine 1.52 (H) 0.6 - 1.3 MG/DL  
 BUN/Creatinine ratio 13 12 - 20 GFR est AA 51 (L) >60 ml/min/1.73m2 GFR est non-AA 42 (L) >60 ml/min/1.73m2 Calcium 9.1 8.5 - 10.1 MG/DL Bilirubin, total 0.4 0.2 - 1.0 MG/DL  
 ALT (SGPT) 18 13 - 56 U/L  
 AST (SGOT) 13 (L) 15 - 37 U/L Alk. phosphatase 154 (H) 45 - 117 U/L Protein, total 10.3 (H) 6.4 - 8.2 g/dL Albumin 4.7 3.4 - 5.0 g/dL Globulin 5.6 (H) 2.0 - 4.0 g/dL A-G Ratio 0.8 0.8 - 1.7 CARDIAC PANEL,(CK, CKMB & TROPONIN) Collection Time: 11/16/18  1:00 AM  
Result Value Ref Range CK 33 26 - 192 U/L  
 CK - MB <1.0 <3.6 ng/ml CK-MB Index  0.0 - 4.0 % CALCULATION NOT PERFORMED WHEN RESULT IS BELOW LINEAR LIMIT Troponin-I, Qt. <0.02 0.0 - 0.045 NG/ML  
MAGNESIUM Collection Time: 11/16/18  1:00 AM  
Result Value Ref Range Magnesium 2.3 1.6 - 2.6 mg/dL HEMOGLOBIN A1C WITH EAG Collection Time: 11/16/18  1:00 AM  
Result Value Ref Range Hemoglobin A1c 11.0 (H) 4.2 - 5.6 % Est. average glucose 269 mg/dL EKG, 12 LEAD, INITIAL Collection Time: 11/16/18  1:26 AM  
Result Value Ref Range Ventricular Rate 121 BPM  
 Atrial Rate 121 BPM  
 P-R Interval 120 ms QRS Duration 88 ms Q-T Interval 314 ms QTC Calculation (Bezet) 445 ms Calculated P Axis 77 degrees Calculated R Axis 77 degrees Calculated T Axis 52 degrees Diagnosis Sinus tachycardia Biatrial enlargement Abnormal ECG No previous ECGs available GLUCOSE, POC Collection Time: 11/16/18  2:13 AM  
Result Value Ref Range Glucose (POC) 571 (HH) 70 - 110 mg/dL Martha Campbell Collection Time: 11/16/18  2:16 AM  
Result Value Ref Range Glucose 571 mg/dL Insulin order 10.2 units/hour Insulin adminstered 10.2 units/hour Multiplier 0.020 Low target 140 mg/dL High target 180 mg/dL D50 order 0.0 ml  
 D50 administered 0.00 ml Minutes until next BG 60 min Order initials CKA Administered initials CKA Radiologic Studies -  
XR CHEST SNGL V    (Results Pending) Preliminary read per ED Physician showed: No pneumonia, no pneumothorax Medical Decision Making I am the first provider for this patient. I reviewed the vital signs, available nursing notes, past medical history, past surgical history, family history and social history. Vital Signs-Reviewed the patient's vital signs. Pulse Oximetry Analysis -  100% on room air (Interpretation) normal 
 
EKG: Interpreted by the EP. Time Interpreted: 1:26 AM 
 Rate: 121 Rhythm: Sinus Tachycardia Interpretation: No STEMI, QRS 88 Records Reviewed: Nursing Notes (Time of Review: 1:36 AM) 
 
ED Course: Progress Notes, Reevaluation, and Consults: 
 
2:18 AM, 11/16/2018 Consult:  Discussed care with Dr. Lexy Nickerson (Intensivist). Standard discussion; including history of patients chief complaint, available diagnostic results, and treatment course. They accept the patient for admission to ICU. Critical Care Time: Critical Care Time: The services I provided to this patient were to treat and/or prevent clinically significant deterioration that could result in the failure of one or more body systems and/or organ systems due to DKA. Services included the following: 
-reviewing nursing notes and old charts 
-vital sign assessments 
-direct patient care 
-medication orders and management 
-interpreting and reviewing diagnostic studies/labs 
-re-evaluations 
-documentation time Aggregate critical care time was 40 minutes, which includes only time during which I was engaged in work directly related to the patient's care as described above, whether I was at bedside or elsewhere in the Emergency Department. It did not include time spent performing other reported procedures or the services of residents, students, nurses, or advance practice providers. Wesley Lombardo MD 
 
2:08 AM 
 
 
 
 
 
Diagnosis Clinical Impression: 
 
Diagnosis: DKA Patient Active Problem List  
Diagnosis Code  DKA (diabetic ketoacidoses) ([facility]) E13.10  Acute renal failure (ARF) ([facility]) N17.9  Depression F32.9  Hyponatremia E87.1  Leukocytosis D72.829  
 Acidotic hyperventilation R06.4 Disposition: ICU No discharge date for patient encounter. Discharge Medications:  
Current Discharge Medication List  
  
 
 
 
 
  
Medication List  
  
ASK your doctor about these medications FLUoxetine 20 mg capsule Commonly known as:  PROzac 
  
insulin pump Misc Commonly known as:  PATIENT SUPPLIED JUNEL 1/20 (21) 1-20 mg-mcg Tab Generic drug:  norethindrone-ethinyl estradiol 
  
lisinopril 2.5 mg tablet Commonly known as:  PRINIVIL, ZESTRIL 
  
ondansetron 4 mg disintegrating tablet Commonly known as:  ZOFRAN ODT Take 1 Tab by mouth every eight (8) hours as needed for Nausea. oxyCODONE-acetaminophen 7.5-325 mg per tablet Commonly known as:  PERCOCET 7.5 Take 1 Tab by mouth every four (4) hours as needed for Pain. Max Daily Amount: 6 Tabs. 
  
  
 
_______________________________ Scribe Attestation Renetta Andre acting as a scribe for and in the presence of Grupo, Lily Stewart MD     
November 16, 2018 at 1:07 AM 
    
Provider Attestation:     
I personally performed the services described in the documentation, reviewed the documentation, as recorded by the scribe in my presence, and it accurately and completely records my words and actions. November 16, 2018 at 1:07 AM - Lily Lombardo MD   
 
 
_______________________________

## 2018-11-16 NOTE — PROGRESS NOTES
Problem: Falls - Risk of 
Goal: *Absence of Falls Document Enoch [place] Fall Risk and appropriate interventions in the flowsheet. Outcome: Progressing Towards Goal 
Fall Risk Interventions: 
Mobility Interventions: Assess mobility with egress test, Bed/chair exit alarm, Communicate number of staff needed for ambulation/transfer, Patient to call before getting OOB Medication Interventions: Assess postural VS orthostatic hypotension, Bed/chair exit alarm, Teach patient to arise slowly, Patient to call before getting OOB Elimination Interventions: Bed/chair exit alarm, Call light in reach, Patient to call for help with toileting needs, Toilet paper/wipes in reach Problem: Pressure Injury - Risk of 
Goal: *Prevention of pressure injury Document Bubba Scale and appropriate interventions in the flowsheet. Outcome: Progressing Towards Goal 
Pressure Injury Interventions: Activity Interventions: Assess need for specialty bed, Pressure redistribution bed/mattress(bed type) Nutrition Interventions: Document food/fluid/supplement intake

## 2018-11-16 NOTE — DIABETES MGMT
Diabetes Patient/Family Education RecordFactors That  May Influence Patients Ability  to Learn or  Comply with Recommendations []   Language barrier    []   Cultural needs   []   Motivation  
 []   Cognitive limitation    []   Physical   []   Education  
 []   Physiological factors   []   Hearing/vision/speaking impairment   []   Restorationist beliefs []   Financial factors   [x]  Other: pt very drowsy   []  No factors identified at this time. Person Instructed: [x]   Patient   []   Family   [x]  Other Preference for Learning: 
 [x]   Verbal   [x]   Written   []  Demonstration Level of Comprehension & Competence:   
[]  Good                                      [] Fair                                     []  Poor                             [x]  Needs Reinforcement  
[x]  Teachback completed Education Component:  
[x]  Medication management, including how to administer insulin (if appropriate) and potential medication interactions V-Go 40  
[]  Nutritional management   
[]  Exercise  
[]  Signs, symptoms, and treatment of hyperglycemia and hypoglycemia  
[] Prevention, recognition and treatment of hyperglycemia and hypoglycemia  
[]  Importance of blood glucose monitoring and how to obtain a blood glucose meter   
[]  Instruction on use of the blood glucose meter  
[]  Discuss the importance of HbA1C monitoring   
[]  Sick day guidelines  
[]  Proper use and disposal of lancets, needles, syringes or insulin pens (if appropriate) []  Potential long-term complications (retinopathy, kidney disease, neuropathy, foot care)  
[] Information about whom to contact in case of emergency or for more information   
[x]  Goal:  Patient/family will demonstrate understanding of Diabetes Self Management Skills by: 11/23/19 Plan for post-discharge education or self-management support: 
  [x] Outpatient class schedule provided            [] Patient Declined [] Scheduled for outpatient classes (date) _______ Caleb Benavidez RD, CDE pgr [phone]

## 2018-11-16 NOTE — DIABETES MGMT
GLYCEMIC CONTROL PLAN OF CARE Assessment/Recommendations: 
Pt discussed in interdisciplinary rounds. Pt is a 21year old female with a past medical history significant for type 1 diabetes and depression. Pt admitted with DKA and started on the GlucoStabilizer insulin drip. Pt remains on the insulin drip at this time with hourly glucose checks. Pt very drowsy when visited but stated she was using a VGo 40 insulin infusion device at home. Once pt ready to transition to subcutaneous insulin, Lantus insulin should be given two hours prior to discontinuing insulin drip. If pt transitions to using home VGo, the drip should be discontinued once pt applies her VGo pump. Most recent blood glucose values: 
11/16/2018 08:48 11/16/2018 10:00 11/16/2018 11:06 11/16/2018 12:09  
181 (H) 229 (H) 222 (H) 199 (H) Current A1C of 11% is equivalent to average blood glucose of 269 mg/dl over the past 2-3 months. Current hospital diabetes medications:  
GlucoStabilizer insulin drip Home diabetes medications: VGo 40 insulin infusion device Diet:  NPO Education:  _x___Refer to Diabetes Education Record  
          ____Education not indicated at this time Lesly [place], [address], [address] [facility]

## 2018-11-16 NOTE — PROGRESS NOTES
[facility] Hospitalist Group Progress Note Patient: Tony Madera Age: 21 y.o. : 1995 MR#: [id number] SSN: [id number] Date/Time: 2018 Subjective:  
 
Pt states she feels better.  and brother present at bedside. Assessment/Plan:  
-DKA 
-LUISANA-resolving 
-Leukocytosis w/ bandemia no clear evidence of infection as cause at this time 
-History of type 1 DM on insulin pump prior to admission. Mentions some type of malfunction where she cannot use manual over ride feature to administer insulin, and possible issues w/ how instrument attaches. 
-Hypophosphatemia in setting of DKA PLAN: 
-cont insulin drip 
-cont to hydrate Additional Notes:   
 
Case discussed with:  [x]Patient  [x]Family  []Nursing  []Case Management DVT Prophylaxis:  []Lovenox  []Hep SQ  []SCDs  []Coumadin   []On Heparin gtt Objective:  
VS:  
Visit Vitals /59 Pulse (!) 115 Temp 98.8 °F (37.1 °C) Resp 20 Ht 5' 8\" (1.727 m) Wt 52.2 kg (115 lb) SpO2 100% Breastfeeding? No  
BMI 17.49 kg/m² Tmax/24hrs: Temp (24hrs), Av.4 °F (36.9 °C), Min:98 °F (36.7 °C), Max:98.8 °F (37.1 °C) Input/Output:  
 
Intake/Output Summary (Last 24 hours) at 2018 1357 Last data filed at 2018 1328 Gross per 24 hour Intake 278.44 ml Output 1800 ml Net -1521.56 ml General:  Alert, awake, in nad Cardiovascular:  Rrr, no murmurs Pulmonary:  ctab GI:  Soft, nt, nd 
Extremities:  No edema Additional:  Dry mm Labs:   
Recent Results (from the past 24 hour(s)) GLUCOSE, POC Collection Time: 18 12:37 AM  
Result Value Ref Range Glucose (POC) 592 (HH) 70 - 110 mg/dL CBC WITH AUTOMATED DIFF Collection Time: 18  1:00 AM  
Result Value Ref Range WBC 23.0 (H) 4.6 - 13.2 K/uL  
 RBC 5.37 (H) 4.20 - 5.30 M/uL  
 HGB 16.3 (H) 12.0 - 16.0 g/dL HCT 48.9 (H) 35.0 - 45.0 %  MCV 91.1 74.0 - 97.0 FL  
 MCH 30.4 24.0 - 34.0 PG  
 MCHC 33.3 31.0 - 37.0 g/dL  
 RDW 12.4 11.6 - 14.5 % PLATELET 677 (H) 405 - 420 K/uL MPV 9.0 (L) 9.2 - 11.8 FL  
 NEUTROPHILS 62 42 - 75 % BAND NEUTROPHILS 13 (H) 0 - 5 % LYMPHOCYTES 18 (L) 20 - 51 % MONOCYTES 6 2 - 9 % EOSINOPHILS 1 0 - 5 % BASOPHILS 0 0 - 3 %  
 ABS. NEUTROPHILS 17.3 (H) 1.8 - 8.0 K/UL  
 ABS. LYMPHOCYTES 4.1 (H) 0.8 - 3.5 K/UL  
 ABS. MONOCYTES 1.4 (H) 0 - 1.0 K/UL  
 ABS. EOSINOPHILS 0.2 0.0 - 0.4 K/UL  
 ABS. BASOPHILS 0.0 0.0 - 0.06 K/UL  
 DF MANUAL PLATELET COMMENTS Increased Platelets RBC COMMENTS NORMOCYTIC, NORMOCHROMIC METABOLIC PANEL, COMPREHENSIVE Collection Time: 11/16/18  1:00 AM  
Result Value Ref Range Sodium 129 (L) 136 - 145 mmol/L Potassium 4.8 3.5 - 5.5 mmol/L Chloride 90 (L) 100 - 108 mmol/L  
 CO2 <5 (LL) 21 - 32 mmol/L Anion gap Cannot be calculated 3.0 - 18 mmol/L Glucose 580 (HH) 74 - 99 mg/dL BUN 20 (H) 7.0 - 18 MG/DL Creatinine 1.52 (H) 0.6 - 1.3 MG/DL  
 BUN/Creatinine ratio 13 12 - 20 GFR est AA 51 (L) >60 ml/min/1.73m2 GFR est non-AA 42 (L) >60 ml/min/1.73m2 Calcium 9.1 8.5 - 10.1 MG/DL Bilirubin, total 0.4 0.2 - 1.0 MG/DL  
 ALT (SGPT) 18 13 - 56 U/L  
 AST (SGOT) 13 (L) 15 - 37 U/L Alk. phosphatase 154 (H) 45 - 117 U/L Protein, total 10.3 (H) 6.4 - 8.2 g/dL Albumin 4.7 3.4 - 5.0 g/dL Globulin 5.6 (H) 2.0 - 4.0 g/dL A-G Ratio 0.8 0.8 - 1.7 CARDIAC PANEL,(CK, CKMB & TROPONIN) Collection Time: 11/16/18  1:00 AM  
Result Value Ref Range CK 33 26 - 192 U/L  
 CK - MB <1.0 <3.6 ng/ml CK-MB Index  0.0 - 4.0 % CALCULATION NOT PERFORMED WHEN RESULT IS BELOW LINEAR LIMIT Troponin-I, Qt. <0.02 0.0 - 0.045 NG/ML  
MAGNESIUM Collection Time: 11/16/18  1:00 AM  
Result Value Ref Range Magnesium 2.3 1.6 - 2.6 mg/dL PHOSPHORUS Collection Time: 11/16/18  1:00 AM  
Result Value Ref Range  Phosphorus 9.3 (H) 2.5 - 4.9 MG/DL  
HEMOGLOBIN A1C WITH EAG  
 Collection Time: 11/16/18  1:00 AM  
Result Value Ref Range Hemoglobin A1c 11.0 (H) 4.2 - 5.6 % Est. average glucose 269 mg/dL BETA HCG, QT Collection Time: 11/16/18  1:00 AM  
Result Value Ref Range Beta HCG, QT <1 0 - 10 MIU/ML  
EKG, 12 LEAD, INITIAL Collection Time: 11/16/18  1:26 AM  
Result Value Ref Range Ventricular Rate 121 BPM  
 Atrial Rate 121 BPM  
 P-R Interval 120 ms QRS Duration 88 ms Q-T Interval 314 ms QTC Calculation (Bezet) 445 ms Calculated P Axis 77 degrees Calculated R Axis 77 degrees Calculated T Axis 52 degrees Diagnosis Sinus tachycardia Biatrial enlargement Abnormal ECG No previous ECGs available POC G3 Collection Time: 11/16/18  1:52 AM  
Result Value Ref Range Device: ROOM AIR    
 FIO2 (POC) 21 % pH (POC) 6.884 (LL) 7.35 - 7.45    
 pCO2 (POC) 9.3 (L) 35.0 - 45.0 MMHG  
 pO2 (POC) 144 (H) 80 - 100 MMHG  
 HCO3 (POC) 1.8 (L) 22 - 26 MMOL/L  
 sO2 (POC) 97 92 - 97 % Base deficit (POC) <30 mmol/L Allens test (POC) YES Site RIGHT BRACHIAL Specimen type (POC) ARTERIAL Performed by Anna Marie Joaquin Total resp. rate 24 GLUCOSE, POC Collection Time: 11/16/18  2:13 AM  
Result Value Ref Range Glucose (POC) 571 (HH) 70 - 110 mg/dL [facility] Collection Time: 11/16/18  2:16 AM  
Result Value Ref Range Glucose 571 mg/dL Insulin order 10.2 units/hour Insulin adminstered 10.2 units/hour Multiplier 0.020 Low target 140 mg/dL High target 180 mg/dL D50 order 0.0 ml  
 D50 administered 0.00 ml Minutes until next BG 60 min Order initials CKA Administered initials CKA URINALYSIS W/ RFLX MICROSCOPIC Collection Time: 11/16/18  3:15 AM  
Result Value Ref Range Color YELLOW Appearance CLEAR Specific gravity 1.022 1.005 - 1.030    
 pH (UA) 5.0 5.0 - 8.0 Protein 30 (A) NEG mg/dL Glucose >1,000 (A) NEG mg/dL Ketone >160 (A) NEG mg/dL Bilirubin NEGATIVE  NEG Blood NEGATIVE  NEG Urobilinogen 0.2 0.2 - 1.0 EU/dL Nitrites NEGATIVE  NEG Leukocyte Esterase NEGATIVE  NEG    
HCG URINE, QL Collection Time: 11/16/18  3:15 AM  
Result Value Ref Range HCG urine, QL NEGATIVE  NEG    
URINE MICROSCOPIC ONLY Collection Time: 11/16/18  3:15 AM  
Result Value Ref Range WBC 4 to 10 0 - 5 /hpf  
 RBC NONE 0 - 5 /hpf Epithelial cells 2+ 0 - 5 /lpf Bacteria FEW (A) NEG /hpf Yeast FEW (A) NEG    
GLUCOSE, POC Collection Time: 11/16/18  3:23 AM  
Result Value Ref Range Glucose (POC) 480 (HH) 70 - 110 mg/dL Martha Campbell Collection Time: 11/16/18  3:24 AM  
Result Value Ref Range Glucose 480 mg/dL Insulin order 4.2 units/hour Insulin adminstered 4.2 units/hour Multiplier 0.010 Low target 140 mg/dL High target 180 mg/dL D50 order 0.0 ml  
 D50 administered 0.00 ml Minutes until next BG 60 min Order initials CKA Administered initials CKA GLUCOSE, POC Collection Time: 11/16/18  4:23 AM  
Result Value Ref Range Glucose (POC) 413 (HH) 70 - 110 mg/dL Martha Campbell Collection Time: 11/16/18  4:25 AM  
Result Value Ref Range Glucose 413 mg/dL Insulin order 7.1 units/hour Insulin adminstered 7.1 units/hour Multiplier 0.020 Low target 140 mg/dL High target 180 mg/dL D50 order 0.0 ml  
 D50 administered 0.00 ml Minutes until next BG 60 min Order initials bwe Administered initials bwe POC G3 Collection Time: 11/16/18  5:00 AM  
Result Value Ref Range Device: ROOM AIR    
 FIO2 (POC) 0.21 % pH (POC) 7.092 (LL) 7.35 - 7.45    
 pCO2 (POC) 15.9 (L) 35.0 - 45.0 MMHG  
 pO2 (POC) 123 (H) 80 - 100 MMHG  
 HCO3 (POC) 4.9 (L) 22 - 26 MMOL/L  
 sO2 (POC) 97 92 - 97 % Base deficit (POC) 25 mmol/L Allens test (POC) YES Total resp. rate 36 Site LEFT RADIAL Patient temp. 37.0  Specimen type (POC) ARTERIAL    
 Performed by Fan Hill CBC WITH AUTOMATED DIFF Collection Time: 11/16/18  5:03 AM  
Result Value Ref Range WBC 20.7 (H) 4.6 - 13.2 K/uL  
 RBC 4.68 4.20 - 5.30 M/uL  
 HGB 14.3 12.0 - 16.0 g/dL HCT 41.6 35.0 - 45.0 % MCV 88.9 74.0 - 97.0 FL  
 MCH 30.6 24.0 - 34.0 PG  
 MCHC 34.4 31.0 - 37.0 g/dL  
 RDW 12.4 11.6 - 14.5 % PLATELET 994 195 - 928 K/uL MPV 8.9 (L) 9.2 - 11.8 FL  
 NEUTROPHILS 69 42 - 75 % BAND NEUTROPHILS 16 (H) 0 - 5 % LYMPHOCYTES 9 (L) 20 - 51 % MONOCYTES 6 2 - 9 % EOSINOPHILS 0 0 - 5 % BASOPHILS 0 0 - 3 %  
 ABS. NEUTROPHILS 17.6 (H) 1.8 - 8.0 K/UL  
 ABS. LYMPHOCYTES 1.9 0.8 - 3.5 K/UL  
 ABS. MONOCYTES 1.2 (H) 0 - 1.0 K/UL  
 ABS. EOSINOPHILS 0.0 0.0 - 0.4 K/UL  
 ABS. BASOPHILS 0.0 0.0 - 0.06 K/UL  
 DF MANUAL PLATELET COMMENTS ADEQUATE PLATELETS    
 RBC COMMENTS ANISOCYTOSIS 
1+ MAGNESIUM Collection Time: 11/16/18  5:03 AM  
Result Value Ref Range Magnesium 1.7 1.6 - 2.6 mg/dL PHOSPHORUS Collection Time: 11/16/18  5:03 AM  
Result Value Ref Range Phosphorus 4.6 2.5 - 4.9 MG/DL  
METABOLIC PANEL, BASIC Collection Time: 11/16/18  5:03 AM  
Result Value Ref Range Sodium 139 136 - 145 mmol/L Potassium 4.0 3.5 - 5.5 mmol/L Chloride 107 100 - 108 mmol/L  
 CO2 <5 (LL) 21 - 32 mmol/L Anion gap Cannot be calculated 3.0 - 18 mmol/L Glucose 335 (H) 74 - 99 mg/dL BUN 18 7.0 - 18 MG/DL Creatinine 1.12 0.6 - 1.3 MG/DL  
 BUN/Creatinine ratio 16 12 - 20 GFR est AA >60 >60 ml/min/1.73m2 GFR est non-AA >60 >60 ml/min/1.73m2 Calcium 7.5 (L) 8.5 - 10.1 MG/DL  
GLUCOSE, POC Collection Time: 11/16/18  5:28 AM  
Result Value Ref Range Glucose (POC) 312 (H) 70 - 110 mg/dL Amanda Borne Collection Time: 11/16/18  5:28 AM  
Result Value Ref Range Glucose 312 mg/dL Insulin order 2.5 units/hour Insulin adminstered 2.5 units/hour Multiplier 0.010 Low target 140 mg/dL High target 180 mg/dL D50 order 0.0 ml  
 D50 administered 0.00 ml Minutes until next BG 60 min Order initials bwe Administered initials bwe GLUCOSE, POC Collection Time: 11/16/18  6:35 AM  
Result Value Ref Range Glucose (POC) 248 (H) 70 - 110 mg/dL Rebecca Solis Collection Time: 11/16/18  6:36 AM  
Result Value Ref Range Glucose 248 mg/dL Insulin order 3.8 units/hour Insulin adminstered 3.8 units/hour Multiplier 0.020 Low target 140 mg/dL High target 180 mg/dL D50 order 0.0 ml  
 D50 administered 0.00 ml Minutes until next BG 60 min Order initials bwe Administered initials bwe GLUCOSE, POC Collection Time: 11/16/18  7:48 AM  
Result Value Ref Range Glucose (POC) 206 (H) 70 - 110 mg/dL Rebecca Solis Collection Time: 11/16/18  7:50 AM  
Result Value Ref Range Glucose 206 mg/dL Insulin order 4.4 units/hour Insulin adminstered 4.4 units/hour Multiplier 0.030 Low target 140 mg/dL High target 180 mg/dL D50 order 0.0 ml  
 D50 administered 0.00 ml Minutes until next BG 60 min Order initials bmw Administered initials bmw GLUCOSE, POC Collection Time: 11/16/18  8:48 AM  
Result Value Ref Range Glucose (POC) 181 (H) 70 - 110 mg/dL Rebecca Solis Collection Time: 11/16/18  8:49 AM  
Result Value Ref Range Glucose 181 mg/dL Insulin order 4.8 units/hour Insulin adminstered 4.8 units/hour Multiplier 0.040 Low target 140 mg/dL High target 180 mg/dL D50 order 0.0 ml  
 D50 administered 0.00 ml Minutes until next BG 60 min Order initials bmw Administered initials bmw METABOLIC PANEL, BASIC Collection Time: 11/16/18  9:30 AM  
Result Value Ref Range Sodium 141 136 - 145 mmol/L Potassium 3.5 3.5 - 5.5 mmol/L Chloride 111 (H) 100 - 108 mmol/L  
 CO2 12 (L) 21 - 32 mmol/L Anion gap 18 3.0 - 18 mmol/L Glucose 167 (H) 74 - 99 mg/dL  BUN 15 7.0 - 18 MG/DL  
 Creatinine 0.89 0.6 - 1.3 MG/DL  
 BUN/Creatinine ratio 17 12 - 20 GFR est AA >60 >60 ml/min/1.73m2 GFR est non-AA >60 >60 ml/min/1.73m2 Calcium 7.7 (L) 8.5 - 10.1 MG/DL MAGNESIUM Collection Time: 11/16/18  9:30 AM  
Result Value Ref Range Magnesium 2.8 (H) 1.6 - 2.6 mg/dL PHOSPHORUS Collection Time: 11/16/18  9:30 AM  
Result Value Ref Range Phosphorus 1.1 (L) 2.5 - 4.9 MG/DL  
GLUCOSE, POC Collection Time: 11/16/18 10:00 AM  
Result Value Ref Range Glucose (POC) 229 (H) 70 - 110 mg/dL [facility] Collection Time: 11/16/18 10:01 AM  
Result Value Ref Range Glucose 229 mg/dL Insulin order 8.5 units/hour Insulin adminstered 8.5 units/hour Multiplier 0.050 Low target 140 mg/dL High target 180 mg/dL D50 order 0.0 ml  
 D50 administered 0.00 ml Minutes until next BG 60 min Order initials bmw Administered initials bmw GLUCOSE, POC Collection Time: 11/16/18 11:06 AM  
Result Value Ref Range Glucose (POC) 222 (H) 70 - 110 mg/dL [facility] Collection Time: 11/16/18 11:06 AM  
Result Value Ref Range Glucose 222 mg/dL Insulin order 9.7 units/hour Insulin adminstered 9.7 units/hour Multiplier 0.060 Low target 140 mg/dL High target 180 mg/dL D50 order 0.0 ml  
 D50 administered 0.00 ml Minutes until next BG 60 min Order initials bmw Administered initials bmw GLUCOSE, POC Collection Time: 11/16/18 12:09 PM  
Result Value Ref Range Glucose (POC) 199 (H) 70 - 110 mg/dL [facility] Collection Time: 11/16/18 12:09 PM  
Result Value Ref Range Glucose 199 mg/dL Insulin order 9.7 units/hour Insulin adminstered 9.7 units/hour Multiplier 0.070 Low target 140 mg/dL High target 180 mg/dL D50 order 0.0 ml  
 D50 administered 0.00 ml Minutes until next BG 60 min Order initials bmw Administered initials bmw GLUCOSE, POC  Collection Time: 11/16/18  1:17 PM  
 Result Value Ref Range Glucose (POC) 158 (H) 70 - 110 mg/dL Fredy Sams Collection Time: 11/16/18  1:18 PM  
Result Value Ref Range Glucose 158 mg/dL Insulin order 6.9 units/hour Insulin adminstered 6.9 units/hour Multiplier 0.070 Low target 140 mg/dL High target 180 mg/dL D50 order 0.0 ml  
 D50 administered 0.00 ml Minutes until next BG 60 min Order initials bmw Administered initials bmw Additional Data Reviewed:   
 
Signed By: Sarina Devries MD   
 November 16, 2018 1:57 PM

## 2018-11-16 NOTE — ED TRIAGE NOTES
Pt brought in by  for dizziness, and shortness of breath.   Pt tachypneac,  Pale on arrival.   states she called him and said she was sick about 6pm.

## 2018-11-16 NOTE — PROGRESS NOTES
attended the interdisciplinary rounds for [facility], who is a 23 y.o.,female. Patients Primary Language is: [place]. According to the patients EMR Voodoo Affiliation is: [place]. The reason the Patient came to the hospital is:  
Patient Active Problem List  
 Diagnosis Date Noted  DKA (diabetic ketoacidoses) ([facility] 11/16/2018  Acute renal failure (ARF) ([facility] 11/16/2018  Depression 11/16/2018  Hyponatremia 11/16/2018  Leukocytosis 11/16/2018  Acidotic hyperventilation 11/16/2018 Plan: 
Chaplains will continue to follow and will provide pastoral care on an as needed/requested basis.  recommends bedside caregivers page  on duty if patient shows signs of acute spiritual or emotional distress. [address] [facility] [employer] Board Certified [employer]  
[phone]

## 2018-11-16 NOTE — H&P
History & Physical 
Patient: Maggie Camarena MRN: [id number]  CSN: [id number] YOB: 1995  Age: 21 y.o. Sex: female DOA: 11/16/2018 Chief Complaint:  
Chief Complaint Patient presents with  Shortness of Breath  Dizziness HPI:  
 
Maggie Camarena is a 21 y.o.  female who has PMH of type I DM on Insulin pump and compliant as per . Pt called her  at work midday yesterday c/o that she is not feeling well. Her  asked her to rest up and take Tylenol. Pt's  went back home 4 hours later to find pt in severe distress, breathing heavily and vomiting and looks ill. He then brought Pt to Er. In Er, pt was in severe distress and almost unresponsive. Found to have FS of close to 600, has tachypnea and tachycardia. BP was elevated and looked dehydrated. CMP showed severe metabolic acidosis and AG was very high. As per , he thinks that Pt is compliant with her insulin regimen and when pt asked she states that she is compliant most of the time. Denied malfunction of her insulin pump. Pt will be admitted to ICU for DKA. Receiving IVF, bicarb and insulin drip at this time Past Medical History:  
Diagnosis Date  Depression Anxiety  Diabetes ([facility] History reviewed. No pertinent surgical history. History reviewed. No pertinent family history. Social History Socioeconomic History  Marital status:  Spouse name: Not on file  Number of children: Not on file  Years of education: Not on file  Highest education level: Not on file Social Needs  Financial resource strain: Not on file  Food insecurity - worry: Not on file  Food insecurity - inability: Not on file  Transportation needs - medical: Not on file  Transportation needs - non-medical: Not on file Occupational History  Not on file Tobacco Use  Smoking status: Never Smoker  Smokeless tobacco: Never Used Substance and Sexual Activity  Alcohol use: No  
 Drug use: No  
 Sexual activity: Not on file Other Topics Concern  Not on file Social History Narrative  Not on file Prior to Admission medications Medication Sig Start Date End Date Taking? Authorizing Provider  
oxyCODONE-acetaminophen (PERCOCET 7.5) 7.5-325 mg per tablet Take 1 Tab by mouth every four (4) hours as needed for Pain. Max Daily Amount: 6 Tabs. 17   Calla Gowers, MD  
ondansetron (ZOFRAN ODT) 4 mg disintegrating tablet Take 1 Tab by mouth every eight (8) hours as needed for Nausea. 17   Calla Gowers, MD  
 insulin pump (PATIENT SUPPLIED) misc by SubCUTAneous route as needed. Provider, Historical  
FLUoxetine (PROZAC) 20 mg capsule Take 20 mg by mouth daily. 17   Provider, Historical  
lisinopril (PRINIVIL, ZESTRIL) 2.5 mg tablet Take  by mouth daily. Other, MD Marylu  
norethindrone-ethinyl estradiol (JUNEL ,) 1-20 mg-mcg tab Take  by mouth. Other, MD Marylu  
 
 
No Known Allergies Review of Systems Unable to obtain, pt is extremely Lethargic Physical Exam:  
 
Physical Exam: 
Visit Vitals /74 Pulse (!) 135 Temp 98.4 °F (36.9 °C) Resp (!) 32 Wt 52.2 kg (115 lb) SpO2 100% BMI 17.49 kg/m² Temp (24hrs), Av.4 °F (36.9 °C), Min:98.4 °F (36.9 °C), Max:98.4 °F (36.9 °C) No intake/output data recorded. No intake/output data recorded. General:  Lethargic but easily awaken in severe distress and hyperventilating. Looks malnourished moderately. underweight with normal albumin Head: Normocephalic, without obvious abnormality, atraumatic. Eyes:  Conjunctivae/corneas clear. PERRL, EOMs intact. Nose: Nares normal. No drainage or sinus tenderness. Neck: Supple, symmetrical, trachea midline, no adenopathy, thyroid: no enlargement, no carotid bruit and no JVD. Lungs:   Clear to auscultation bilaterally. Donald Rough Heart:  Regular rate and rhythm, S1, S2 Tachycardia @ 130 Abdomen: Soft, non-tender. Bowel sounds normal. +ve N/V PTA Extremities: Extremities normal, atraumatic, no cyanosis or edema. Pulses: 2+ and symmetric all extremities. Skin:  No rashes or lesions Neurologic: Lethargic. Moves all ext. Labs Reviewed: 
 
CMP:  
Lab Results Component Value Date/Time  (L) 11/16/2018 01:00 AM  
 K 4.8 11/16/2018 01:00 AM  
 CL 90 (L) 11/16/2018 01:00 AM  
 CO2 <5 (LL) 11/16/2018 01:00 AM  
 AGAP Cannot be calculated 11/16/2018 01:00 AM  
  (HH) 11/16/2018 01:00 AM  
 BUN 20 (H) 11/16/2018 01:00 AM  
 CREA 1.52 (H) 11/16/2018 01:00 AM  
 GFRAA 51 (L) 11/16/2018 01:00 AM  
 GFRNA 42 (L) 11/16/2018 01:00 AM  
 CA 9.1 11/16/2018 01:00 AM  
 MG 2.3 11/16/2018 01:00 AM  
 PHOS 9.3 (H) 11/16/2018 01:00 AM  
 ALB 4.7 11/16/2018 01:00 AM  
 TP 10.3 (H) 11/16/2018 01:00 AM  
 GLOB 5.6 (H) 11/16/2018 01:00 AM  
 AGRAT 0.8 11/16/2018 01:00 AM  
 SGOT 13 (L) 11/16/2018 01:00 AM  
 ALT 18 11/16/2018 01:00 AM  
 
Recent Glucose Results:  
Lab Results Component Value Date/Time  (HH) 11/16/2018 01:00 AM  
 
ABG: No results found for: PH, PHI, PCO2, PCO2I, PO2, PO2I, HCO3, HCO3I, FIO2, FIO2I 
COAGS: No results found for: APTT, PTP, INR Liver Panel:  
Lab Results Component Value Date/Time ALB 4.7 11/16/2018 01:00 AM  
 TP 10.3 (H) 11/16/2018 01:00 AM  
 GLOB 5.6 (H) 11/16/2018 01:00 AM  
 AGRAT 0.8 11/16/2018 01:00 AM  
 SGOT 13 (L) 11/16/2018 01:00 AM  
 ALT 18 11/16/2018 01:00 AM  
  (H) 11/16/2018 01:00 AM  
 
CXR and EKG Procedures/imaging: see electronic medical records for all procedures/Xrays and details which were not copied into this note but were reviewed prior to creation of Plan Assessment/Plan Principal Problem: 
  DKA (diabetic ketoacidoses) ([facility] (11/16/2018) Active Problems: 
  Acute renal failure (ARF) ([facility] (11/16/2018) Depression (11/16/2018) Hyponatremia (11/16/2018) Leukocytosis (11/16/2018) Acidotic hyperventilation (11/16/2018) Pt is admitted to ICU for severe DKA of unknown etiology with Leukocytosis w/out signs of infection Pseudohyponatremeia  
severe metabolic acidosis mostly Respiratory alkalosis for compensation Continue IV insulin and fluids per DKA protocol. NPO Replace K once below 5 while Pt on Insulin drip Bicarb IV bolus and gtt Monitor BMP Q4h along with Mag and phos. Monitor urine output with goal of at least 50 to 80cc per hr Once anion gap closes and bicarbonate normalizes we will take off insulin drip with transition to long acting insulin. Check hemoglobin A1C and fasting lipid panel to better risk stratify patient's cardiovascular status Hold home meds for now DVT/GI Prophylaxis: Hep SQ Plan of care is discussed in details with Patient/Family at bedside and agreed upon Estrada Downing MD 
11/16/2018 2:32 AM

## 2018-11-16 NOTE — PROGRESS NOTES
NUTRITION Nursing Referral: [facility] 
  
RECOMMENDATIONS / PLAN:  
 
- Continue IV fluid as medically appropriate.  
- Continue to monitor and replace electrolytes as needed. - Monitor GI symptoms and readiness for diet initiation.   
- Follow up to provide diabetic diet education prior to discharge. - Continue RD inpatient monitoring and evaluation. NUTRITION INTERVENTIONS & DIAGNOSIS:  
 
[] Meals/snacks: modify composition, NPO [x] IV fluid: D5 1/2NS at 150 mL/hr (180 gm dextrose, 612 kcal per day) [x] Vitamin and mineral supplement therapy: 2 gm Mg, 30 mEq KCl [x] Nutrition education/counseling: provide diabetic diet education prior to discharge  
[x] Collaboration and referral of nutrition care: interdisciplinary rounds Nutrition Diagnosis:  
Underweight related to inadequate energy intake, undesirable food choices and uncontrolled diabetes as evidenced by BMI of 17.5 kg/m^2. Inadequate oral intake related to altered GI function, admitted with DKA as evidenced by pt NPO on insulin drip with nausea/vomiting and poor po intake x 1 day PTA. ASSESSMENT:  
 
Admitted for DKA started on insulin drip and fluid resuscitation. Remains NPO and receiving IV fluid, still dry but Crea trending down. C/o nausea/vomting and weakness that started the day of admission but eating well with good appetite before then. States she follows some diabetic diet restrictions but agreeable to education once feeling better, prior to discharge. Average po intake adequate to meet patients estimated nutritional needs:   [] Yes     [x] No   [] Unable to determine at this time Diet: DIET NPO Food Allergies: NKFA Current Appetite:   [] Good     [] Fair     [] Poor     [x] Other: NPO, nausea improving and hungry Appetite/meal intake prior to admission:   [x] Good usually, prior to day of admission     [] Fair     [] Poor     [x] Other: c/o nausea/abdominal pain and episode of emesis day of admission Feeding Limitations:  [] Swallowing difficulty    [] Chewing difficulty    [] Other: 
Current Meal Intake: No data found. BM: 11/15 Skin Integrity: WDL Edema:   [x] No     [] Yes Pertinent Medications: Reviewed: colace, zofran, insulin drip glucostabilizer, fluid bolus given Recent Labs 11/16/18 
0503 11/16/18 
0100  129*  
K 4.0 4.8  
 90* CO2 <5* <5* * 580* BUN 18 20* CREA 1.12 1.52* CA 7.5* 9.1 MG 1.7 2.3 PHOS 4.6 9.3* ALB  --  4.7 SGOT  --  13* ALT  --  18 Intake/Output Summary (Last 24 hours) at 11/16/2018 1043 Last data filed at 11/16/2018 7861 Gross per 24 hour Intake 20.85 ml Output 1200 ml Net -1179.15 ml Anthropometrics: 
Ht Readings from Last 1 Encounters:  
11/16/18 5' 8\" (1.727 m) Last 3 Recorded Weights in this Encounter 11/16/18 0043 11/16/18 0515 Weight: 52.2 kg (115 lb) 52.2 kg (115 lb) Body mass index is 17.49 kg/m². Underweight Weight History: pt reports usual weight of 118 lb and unsure of change in weight PTA; 3 lb, 2.5% weight loss x year per chart Weight Metrics 11/16/2018 8/17/2017 4/13/2017 Weight 115 lb 118 lb 5 oz 118 lb BMI 17.49 kg/m2 17.99 kg/m2 17.94 kg/m2 Admitting Diagnosis: [facility] Pertinent PMHx: diabetes, depression, anxiety Education Needs:        [] None identified  [x] Identified - Not appropriate at this time  []  Identified and addressed - refer to education log Learning Limitations:   [x] None identified  [] Identified Cultural, Yarsanism & ethnic food preferences:  [x] None identified    [] Identified and addressed ESTIMATED NUTRITION NEEDS:  
 
Calories: 0973-1309 kcal (MSJx1.2-1.5) based on  [x] Actual BW 52 kg     [] IBW Protein: 46-62 gm (0.8-1.2 gm/kg) based on  [x] Actual BW      [] IBW Fluid: 1 mL/kcal 
  
MONITORING & EVALUATION:  
 
Nutrition Goal(s):  
 1. Po intake of meals will meet >75% of patient estimated nutritional needs within the next 7 days. Outcome:  [] Met/Ongoing    []  Not Met    [x] New/Initial Goal 
2. Patient will increase knowledge of appropriate food choices on a diabetic diet within 7 days. Outcome:  [] Met    []  Not Met    [x] New/Initial Goal  
 
Monitoring:   [x] Food and beverage intake   [x] Diet order   [x] Nutrition-focused physical findings   [x] Treatment/therapy   [] Weight   [] Enteral nutrition intake Previous Recommendations (for follow-up assessments only):     []   Implemented       []   Not Implemented (RD to address)      [] No Longer Appropriate     [] No Recommendation Made Discharge Planning: diabetic diet as tolerated  
[x] Participated in care planning, discharge planning, & interdisciplinary rounds as appropriate Anabelle Martinez RD, [address]  Pager: [phone]

## 2018-11-17 LAB
ADMINISTERED INITIALS, ADMINIT: NORMAL
ANION GAP SERPL CALC-SCNC: 10 MMOL/L (ref 3–18)
ANION GAP SERPL CALC-SCNC: 7 MMOL/L (ref 3–18)
ANION GAP SERPL CALC-SCNC: 8 MMOL/L (ref 3–18)
ANION GAP SERPL CALC-SCNC: 9 MMOL/L (ref 3–18)
BASOPHILS # BLD: 0 K/UL (ref 0–0.1)
BASOPHILS # BLD: 0 K/UL (ref 0–0.1)
BASOPHILS NFR BLD: 0 % (ref 0–2)
BASOPHILS NFR BLD: 0 % (ref 0–2)
BUN SERPL-MCNC: 2 MG/DL (ref 7–18)
BUN SERPL-MCNC: 3 MG/DL (ref 7–18)
BUN SERPL-MCNC: 5 MG/DL (ref 7–18)
BUN SERPL-MCNC: 7 MG/DL (ref 7–18)
BUN/CREAT SERPL: 10 (ref 12–20)
BUN/CREAT SERPL: 11 (ref 12–20)
BUN/CREAT SERPL: 5 (ref 12–20)
BUN/CREAT SERPL: 6 (ref 12–20)
CALCIUM SERPL-MCNC: 7.2 MG/DL (ref 8.5–10.1)
CALCIUM SERPL-MCNC: 7.2 MG/DL (ref 8.5–10.1)
CALCIUM SERPL-MCNC: 7.5 MG/DL (ref 8.5–10.1)
CALCIUM SERPL-MCNC: 7.5 MG/DL (ref 8.5–10.1)
CHLORIDE SERPL-SCNC: 114 MMOL/L (ref 100–108)
CO2 SERPL-SCNC: 17 MMOL/L (ref 21–32)
CO2 SERPL-SCNC: 17 MMOL/L (ref 21–32)
CO2 SERPL-SCNC: 18 MMOL/L (ref 21–32)
CO2 SERPL-SCNC: 19 MMOL/L (ref 21–32)
CREAT SERPL-MCNC: 0.4 MG/DL (ref 0.6–1.3)
CREAT SERPL-MCNC: 0.45 MG/DL (ref 0.6–1.3)
CREAT SERPL-MCNC: 0.54 MG/DL (ref 0.6–1.3)
CREAT SERPL-MCNC: 0.68 MG/DL (ref 0.6–1.3)
D50 ADMINISTERED, D50ADM: 0 ML
D50 ORDER, D50ORD: 0 ML
DIFFERENTIAL METHOD BLD: ABNORMAL
DIFFERENTIAL METHOD BLD: ABNORMAL
EOSINOPHIL # BLD: 0.1 K/UL (ref 0–0.4)
EOSINOPHIL # BLD: 0.1 K/UL (ref 0–0.4)
EOSINOPHIL NFR BLD: 1 % (ref 0–5)
EOSINOPHIL NFR BLD: 1 % (ref 0–5)
ERYTHROCYTE [DISTWIDTH] IN BLOOD BY AUTOMATED COUNT: 12.5 % (ref 11.6–14.5)
ERYTHROCYTE [DISTWIDTH] IN BLOOD BY AUTOMATED COUNT: 12.6 % (ref 11.6–14.5)
EST. AVERAGE GLUCOSE BLD GHB EST-MCNC: 266 MG/DL
GLUCOSE BLD STRIP.AUTO-MCNC: 111 MG/DL (ref 70–110)
GLUCOSE BLD STRIP.AUTO-MCNC: 115 MG/DL (ref 70–110)
GLUCOSE BLD STRIP.AUTO-MCNC: 123 MG/DL (ref 70–110)
GLUCOSE BLD STRIP.AUTO-MCNC: 124 MG/DL (ref 70–110)
GLUCOSE BLD STRIP.AUTO-MCNC: 124 MG/DL (ref 70–110)
GLUCOSE BLD STRIP.AUTO-MCNC: 130 MG/DL (ref 70–110)
GLUCOSE BLD STRIP.AUTO-MCNC: 140 MG/DL (ref 70–110)
GLUCOSE BLD STRIP.AUTO-MCNC: 152 MG/DL (ref 70–110)
GLUCOSE BLD STRIP.AUTO-MCNC: 167 MG/DL (ref 70–110)
GLUCOSE BLD STRIP.AUTO-MCNC: 181 MG/DL (ref 70–110)
GLUCOSE BLD STRIP.AUTO-MCNC: 184 MG/DL (ref 70–110)
GLUCOSE BLD STRIP.AUTO-MCNC: 209 MG/DL (ref 70–110)
GLUCOSE BLD STRIP.AUTO-MCNC: 213 MG/DL (ref 70–110)
GLUCOSE BLD STRIP.AUTO-MCNC: 261 MG/DL (ref 70–110)
GLUCOSE SERPL-MCNC: 109 MG/DL (ref 74–99)
GLUCOSE SERPL-MCNC: 119 MG/DL (ref 74–99)
GLUCOSE SERPL-MCNC: 146 MG/DL (ref 74–99)
GLUCOSE SERPL-MCNC: 181 MG/DL (ref 74–99)
GLUCOSE, GLC: 111 MG/DL
GLUCOSE, GLC: 115 MG/DL
GLUCOSE, GLC: 123 MG/DL
GLUCOSE, GLC: 124 MG/DL
GLUCOSE, GLC: 124 MG/DL
GLUCOSE, GLC: 140 MG/DL
GLUCOSE, GLC: 152 MG/DL
GLUCOSE, GLC: 167 MG/DL
GLUCOSE, GLC: 209 MG/DL
GLUCOSE, GLC: 213 MG/DL
GLUCOSE, GLC: 261 MG/DL
HBA1C MFR BLD: 10.9 % (ref 4.2–5.6)
HCT VFR BLD AUTO: 31.3 % (ref 35–45)
HCT VFR BLD AUTO: 32.9 % (ref 35–45)
HGB BLD-MCNC: 11.1 G/DL (ref 12–16)
HGB BLD-MCNC: 11.8 G/DL (ref 12–16)
HIGH TARGET, HITG: 180 MG/DL
INSULIN ADMINSTERED, INSADM: 0.3 UNITS/HOUR
INSULIN ADMINSTERED, INSADM: 0.3 UNITS/HOUR
INSULIN ADMINSTERED, INSADM: 0.4 UNITS/HOUR
INSULIN ADMINSTERED, INSADM: 0.8 UNITS/HOUR
INSULIN ADMINSTERED, INSADM: 1 UNITS/HOUR
INSULIN ADMINSTERED, INSADM: 1.6 UNITS/HOUR
INSULIN ADMINSTERED, INSADM: 2.2 UNITS/HOUR
INSULIN ADMINSTERED, INSADM: 2.7 UNITS/HOUR
INSULIN ADMINSTERED, INSADM: 3 UNITS/HOUR
INSULIN ADMINSTERED, INSADM: 3.2 UNITS/HOUR
INSULIN ADMINSTERED, INSADM: 3.8 UNITS/HOUR
INSULIN ORDER, INSORD: 0.3 UNITS/HOUR
INSULIN ORDER, INSORD: 0.3 UNITS/HOUR
INSULIN ORDER, INSORD: 0.4 UNITS/HOUR
INSULIN ORDER, INSORD: 0.8 UNITS/HOUR
INSULIN ORDER, INSORD: 1 UNITS/HOUR
INSULIN ORDER, INSORD: 1.6 UNITS/HOUR
INSULIN ORDER, INSORD: 2.2 UNITS/HOUR
INSULIN ORDER, INSORD: 2.7 UNITS/HOUR
INSULIN ORDER, INSORD: 3 UNITS/HOUR
INSULIN ORDER, INSORD: 3.2 UNITS/HOUR
INSULIN ORDER, INSORD: 3.8 UNITS/HOUR
LOW TARGET, LOT: 140 MG/DL
LYMPHOCYTES # BLD: 2.2 K/UL (ref 0.9–3.6)
LYMPHOCYTES # BLD: 3 K/UL (ref 0.9–3.6)
LYMPHOCYTES NFR BLD: 22 % (ref 21–52)
LYMPHOCYTES NFR BLD: 32 % (ref 21–52)
MAGNESIUM SERPL-MCNC: 1.8 MG/DL (ref 1.6–2.6)
MAGNESIUM SERPL-MCNC: 1.9 MG/DL (ref 1.6–2.6)
MAGNESIUM SERPL-MCNC: 2.1 MG/DL (ref 1.6–2.6)
MAGNESIUM SERPL-MCNC: 2.1 MG/DL (ref 1.6–2.6)
MAGNESIUM SERPL-MCNC: 2.5 MG/DL (ref 1.6–2.6)
MCH RBC QN AUTO: 29.4 PG (ref 24–34)
MCH RBC QN AUTO: 29.8 PG (ref 24–34)
MCHC RBC AUTO-ENTMCNC: 35.5 G/DL (ref 31–37)
MCHC RBC AUTO-ENTMCNC: 35.9 G/DL (ref 31–37)
MCV RBC AUTO: 82.8 FL (ref 74–97)
MCV RBC AUTO: 83.1 FL (ref 74–97)
MINUTES UNTIL NEXT BG, NBG: 60 MIN
MONOCYTES # BLD: 0.7 K/UL (ref 0.05–1.2)
MONOCYTES # BLD: 0.9 K/UL (ref 0.05–1.2)
MONOCYTES NFR BLD: 8 % (ref 3–10)
MONOCYTES NFR BLD: 9 % (ref 3–10)
MULTIPLIER, MUL: 0.01
MULTIPLIER, MUL: 0.03
MULTIPLIER, MUL: 0.04
NEUTS SEG # BLD: 5.5 K/UL (ref 1.8–8)
NEUTS SEG # BLD: 7 K/UL (ref 1.8–8)
NEUTS SEG NFR BLD: 59 % (ref 40–73)
NEUTS SEG NFR BLD: 68 % (ref 40–73)
ORDER INITIALS, ORDINIT: NORMAL
PH BLDV: 7.31 [PH] (ref 7.32–7.42)
PHOSPHATE SERPL-MCNC: 0.9 MG/DL (ref 2.5–4.9)
PHOSPHATE SERPL-MCNC: 1.5 MG/DL (ref 2.5–4.9)
PHOSPHATE SERPL-MCNC: 1.7 MG/DL (ref 2.5–4.9)
PHOSPHATE SERPL-MCNC: 1.8 MG/DL (ref 2.5–4.9)
PHOSPHATE SERPL-MCNC: 2.3 MG/DL (ref 2.5–4.9)
PLATELET # BLD AUTO: 260 K/UL (ref 135–420)
PLATELET # BLD AUTO: 269 K/UL (ref 135–420)
PMV BLD AUTO: 8.7 FL (ref 9.2–11.8)
PMV BLD AUTO: 8.9 FL (ref 9.2–11.8)
POTASSIUM SERPL-SCNC: 3 MMOL/L (ref 3.5–5.5)
POTASSIUM SERPL-SCNC: 3.5 MMOL/L (ref 3.5–5.5)
POTASSIUM SERPL-SCNC: 3.6 MMOL/L (ref 3.5–5.5)
POTASSIUM SERPL-SCNC: 3.8 MMOL/L (ref 3.5–5.5)
POTASSIUM SERPL-SCNC: 3.8 MMOL/L (ref 3.5–5.5)
RBC # BLD AUTO: 3.78 M/UL (ref 4.2–5.3)
RBC # BLD AUTO: 3.96 M/UL (ref 4.2–5.3)
SODIUM SERPL-SCNC: 139 MMOL/L (ref 136–145)
SODIUM SERPL-SCNC: 140 MMOL/L (ref 136–145)
SODIUM SERPL-SCNC: 141 MMOL/L (ref 136–145)
SODIUM SERPL-SCNC: 141 MMOL/L (ref 136–145)
WBC # BLD AUTO: 10.2 K/UL (ref 4.6–13.2)
WBC # BLD AUTO: 9.3 K/UL (ref 4.6–13.2)

## 2018-11-17 PROCEDURE — 82962 GLUCOSE BLOOD TEST: CPT

## 2018-11-17 PROCEDURE — 74011250636 HC RX REV CODE- 250/636: Performed by: INTERNAL MEDICINE

## 2018-11-17 PROCEDURE — 36415 COLL VENOUS BLD VENIPUNCTURE: CPT

## 2018-11-17 PROCEDURE — 74011250636 HC RX REV CODE- 250/636: Performed by: PHYSICIAN ASSISTANT

## 2018-11-17 PROCEDURE — 74011250637 HC RX REV CODE- 250/637: Performed by: PHYSICIAN ASSISTANT

## 2018-11-17 PROCEDURE — 84100 ASSAY OF PHOSPHORUS: CPT

## 2018-11-17 PROCEDURE — 83036 HEMOGLOBIN GLYCOSYLATED A1C: CPT

## 2018-11-17 PROCEDURE — 74011636637 HC RX REV CODE- 636/637: Performed by: INTERNAL MEDICINE

## 2018-11-17 PROCEDURE — 80048 BASIC METABOLIC PNL TOTAL CA: CPT

## 2018-11-17 PROCEDURE — 83735 ASSAY OF MAGNESIUM: CPT

## 2018-11-17 PROCEDURE — 74011000250 HC RX REV CODE- 250: Performed by: NURSE PRACTITIONER

## 2018-11-17 PROCEDURE — 74011000258 HC RX REV CODE- 258: Performed by: INTERNAL MEDICINE

## 2018-11-17 PROCEDURE — 74011250636 HC RX REV CODE- 250/636: Performed by: NURSE PRACTITIONER

## 2018-11-17 PROCEDURE — 74011000258 HC RX REV CODE- 258: Performed by: NURSE PRACTITIONER

## 2018-11-17 PROCEDURE — 74011000258 HC RX REV CODE- 258: Performed by: PHYSICIAN ASSISTANT

## 2018-11-17 PROCEDURE — 82800 BLOOD PH: CPT

## 2018-11-17 PROCEDURE — 65610000006 HC RM INTENSIVE CARE

## 2018-11-17 PROCEDURE — 86341 ISLET CELL ANTIBODY: CPT

## 2018-11-17 PROCEDURE — 74011250637 HC RX REV CODE- 250/637: Performed by: INTERNAL MEDICINE

## 2018-11-17 PROCEDURE — 74011000250 HC RX REV CODE- 250: Performed by: PHYSICIAN ASSISTANT

## 2018-11-17 PROCEDURE — 85025 COMPLETE CBC W/AUTO DIFF WBC: CPT

## 2018-11-17 RX ORDER — INSULIN LISPRO 100 [IU]/ML
INJECTION, SOLUTION INTRAVENOUS; SUBCUTANEOUS
Status: DISCONTINUED | OUTPATIENT
Start: 2018-11-17 | End: 2018-11-18 | Stop reason: HOSPADM

## 2018-11-17 RX ORDER — INSULIN GLARGINE 100 [IU]/ML
5 INJECTION, SOLUTION SUBCUTANEOUS DAILY
Status: DISCONTINUED | OUTPATIENT
Start: 2018-11-17 | End: 2018-11-17

## 2018-11-17 RX ORDER — INSULIN GLARGINE 100 [IU]/ML
5 INJECTION, SOLUTION SUBCUTANEOUS DAILY
Status: DISCONTINUED | OUTPATIENT
Start: 2018-11-18 | End: 2018-11-17

## 2018-11-17 RX ORDER — SODIUM CHLORIDE 450 MG/100ML
100 INJECTION, SOLUTION INTRAVENOUS CONTINUOUS
Status: DISCONTINUED | OUTPATIENT
Start: 2018-11-17 | End: 2018-11-18

## 2018-11-17 RX ORDER — INSULIN GLARGINE 100 [IU]/ML
5 INJECTION, SOLUTION SUBCUTANEOUS ONCE
Status: DISCONTINUED | OUTPATIENT
Start: 2018-11-17 | End: 2018-11-17

## 2018-11-17 RX ORDER — INSULIN LISPRO 100 [IU]/ML
INJECTION, SOLUTION INTRAVENOUS; SUBCUTANEOUS EVERY 6 HOURS
Status: DISCONTINUED | OUTPATIENT
Start: 2018-11-17 | End: 2018-11-17

## 2018-11-17 RX ORDER — INSULIN GLARGINE 100 [IU]/ML
10 INJECTION, SOLUTION SUBCUTANEOUS DAILY
Status: DISCONTINUED | OUTPATIENT
Start: 2018-11-18 | End: 2018-11-17

## 2018-11-17 RX ORDER — DEXTROSE 50 % IN WATER (D50W) INTRAVENOUS SYRINGE
25-50 AS NEEDED
Status: DISCONTINUED | OUTPATIENT
Start: 2018-11-17 | End: 2018-11-18 | Stop reason: HOSPADM

## 2018-11-17 RX ORDER — INSULIN GLARGINE 100 [IU]/ML
20 INJECTION, SOLUTION SUBCUTANEOUS
Status: DISCONTINUED | OUTPATIENT
Start: 2018-11-17 | End: 2018-11-18 | Stop reason: HOSPADM

## 2018-11-17 RX ORDER — POTASSIUM CHLORIDE 1.5 G/1.77G
20 POWDER, FOR SOLUTION ORAL
Status: DISCONTINUED | OUTPATIENT
Start: 2018-11-17 | End: 2018-11-18

## 2018-11-17 RX ORDER — MAGNESIUM SULFATE HEPTAHYDRATE 40 MG/ML
2 INJECTION, SOLUTION INTRAVENOUS ONCE
Status: COMPLETED | OUTPATIENT
Start: 2018-11-17 | End: 2018-11-17

## 2018-11-17 RX ADMIN — FAMOTIDINE 20 MG: 10 INJECTION, SOLUTION INTRAVENOUS at 08:48

## 2018-11-17 RX ADMIN — INSULIN GLARGINE 5 UNITS: 100 INJECTION, SOLUTION SUBCUTANEOUS at 12:06

## 2018-11-17 RX ADMIN — DEXTROSE MONOHYDRATE AND SODIUM CHLORIDE 150 ML/HR: 5; .45 INJECTION, SOLUTION INTRAVENOUS at 07:26

## 2018-11-17 RX ADMIN — SODIUM CHLORIDE 0.4 UNITS/HR: 900 INJECTION, SOLUTION INTRAVENOUS at 05:10

## 2018-11-17 RX ADMIN — INSULIN LISPRO 6 UNITS: 100 INJECTION, SOLUTION INTRAVENOUS; SUBCUTANEOUS at 13:41

## 2018-11-17 RX ADMIN — LIDOCAINE HYDROCHLORIDE: 10 INJECTION, SOLUTION EPIDURAL; INFILTRATION; INTRACAUDAL; PERINEURAL at 06:13

## 2018-11-17 RX ADMIN — POTASSIUM CHLORIDE 20 MEQ: 1.5 POWDER, FOR SOLUTION ORAL at 16:40

## 2018-11-17 RX ADMIN — ACETAMINOPHEN 650 MG: 325 TABLET ORAL at 15:25

## 2018-11-17 RX ADMIN — SODIUM CHLORIDE 100 ML/HR: 450 INJECTION, SOLUTION INTRAVENOUS at 16:40

## 2018-11-17 RX ADMIN — LIDOCAINE HYDROCHLORIDE: 10 INJECTION, SOLUTION EPIDURAL; INFILTRATION; INTRACAUDAL; PERINEURAL at 05:12

## 2018-11-17 RX ADMIN — MAGNESIUM SULFATE HEPTAHYDRATE 2 G: 40 INJECTION, SOLUTION INTRAVENOUS at 16:40

## 2018-11-17 RX ADMIN — INSULIN LISPRO 5 UNITS: 100 INJECTION, SOLUTION INTRAVENOUS; SUBCUTANEOUS at 17:52

## 2018-11-17 RX ADMIN — DEXTROSE MONOHYDRATE AND SODIUM CHLORIDE 125 ML/HR: 5; .45 INJECTION, SOLUTION INTRAVENOUS at 10:51

## 2018-11-17 RX ADMIN — LIDOCAINE HYDROCHLORIDE: 10 INJECTION, SOLUTION EPIDURAL; INFILTRATION; INTRACAUDAL; PERINEURAL at 07:18

## 2018-11-17 RX ADMIN — DEXTROSE MONOHYDRATE AND SODIUM CHLORIDE 150 ML/HR: 5; .45 INJECTION, SOLUTION INTRAVENOUS at 04:05

## 2018-11-17 RX ADMIN — ENOXAPARIN SODIUM 40 MG: 100 INJECTION SUBCUTANEOUS at 12:06

## 2018-11-17 RX ADMIN — LIDOCAINE HYDROCHLORIDE: 10 INJECTION, SOLUTION EPIDURAL; INFILTRATION; INTRACAUDAL; PERINEURAL at 04:06

## 2018-11-17 RX ADMIN — SODIUM CHLORIDE 3.2 UNITS/HR: 900 INJECTION, SOLUTION INTRAVENOUS at 00:45

## 2018-11-17 RX ADMIN — POTASSIUM PHOSPHATE, MONOBASIC AND POTASSIUM PHOSPHATE, DIBASIC: 224; 236 INJECTION, SOLUTION INTRAVENOUS at 04:06

## 2018-11-17 RX ADMIN — INSULIN GLARGINE 20 UNITS: 100 INJECTION, SOLUTION SUBCUTANEOUS at 21:48

## 2018-11-17 NOTE — PROGRESS NOTES
[facility] ICU Progress Note Name: Jesus Bridges : 1995 MRN: [id number] Date: 2018 3:07 PM 
  
[x]I have reviewed the flowsheet and previous days notes. Events overnight reviewed and discussed with nursing staff. Vital signs and records reviewed. Subjective: No new events overnight. Patient doing well, c/o some abdominal pain and HA this AM, wants to have food this AM 
 
[]The patient is unable to give any meaningful history or review of systems because the patient is: 
[]Intubated []Sedated  
[]Unresponsive []The patient is critically ill on     
[]Mechanical ventilation []Pressors []BiPAP [] ROS:Review of systems not obtained due to patient factors. Medication Review: · Pressors - N/A · Sedation - N/A 
· Antibiotics - N/A 
· Pain - APAP 
· GI/ DVT - famotidine/heparin · Others (other gtts) Safety Bundles: VAP Bundle/ CAUTI/ Severe Sepsis Protocol/ Electrolyte Replacement Protocol Vital Signs:   
Visit Vitals /72 Pulse (!) 107 Temp 98.4 °F (36.9 °C) Resp 13 Ht 5' 8\" (1.727 m) Wt 52.2 kg (115 lb) SpO2 100% Breastfeeding? No  
BMI 17.49 kg/m² O2 Device: Room air Temp (24hrs), Av.6 °F (37 °C), Min:98.2 °F (36.8 °C), Max:98.9 °F (37.2 °C) Intake/Output:  
Last shift:      701 - 1900 In: -  
Out: 750 [Urine:750] Last 3 shifts: 11/15 1901 - 700 In: 4111.6 [I.V.:4111.6] Out: 3150 [CFXE:0152] Intake/Output Summary (Last 24 hours) at 2018 1507 Last data filed at 2018 1216 Gross per 24 hour Intake 3833.17 ml Output 2100 ml Net 1733.17 ml Ventilator Settings: 
  
  
  
  
 
Physical Exam: 
 
General: Normal appearing female, NAD, comfortable HEENT:  Anicteric sclerae; pink palpebral conjunctivae; mucosa dry Resp:  Symmetrical chest expansion, no accessory muscle use; good airway entry; no rales/ wheezing/ rhonchi noted CV:  S1, S2 present; tachycardic GI:  Abdomen soft, non-tender; + BS Extremities:  +2 pulses on all extremities; no edema/ cyanosis/ clubbing noted; normal capillary refill Skin:  Warm; no rashes/ lesions noted Neurologic:  Non-focal 
Devices:  No NGT/OGT, Central line/ PICC, ETT/tracheostomy, chest tube DATA:  
 
Current Facility-Administered Medications Medication Dose Route Frequency  insulin glargine (LANTUS) injection 5 Units  5 Units SubCUTAneous DAILY  insulin lispro (HUMALOG) injection   SubCUTAneous Q6H  
 dextrose (D50W) injection syrg 12.5-25 g  25-50 mL IntraVENous PRN  
 glucose chewable tablet 16 g  4 Tab Oral PRN  
 glucagon (GLUCAGEN) injection 1 mg  1 mg IntraMUSCular PRN  
 dextrose (D50W) injection syrg 12.5-25 g  25-50 mL IntraVENous PRN  
 naloxone (NARCAN) injection 0.4 mg  0.4 mg IntraVENous PRN  
 ondansetron (ZOFRAN) injection 4 mg  4 mg IntraVENous Q6H PRN  
 docusate sodium (COLACE) capsule 100 mg  100 mg Oral BID PRN  
 acetaminophen (TYLENOL) suppository 650 mg  650 mg Rectal Q4H PRN  
 famotidine (PF) (PEPCID) 20 mg in sodium chloride 0.9% 10 mL injection  20 mg IntraVENous DAILY  dextrose 5 % - 0.45% NaCl infusion  125 mL/hr IntraVENous CONTINUOUS  
 acetaminophen (TYLENOL) tablet 650 mg  650 mg Oral Q4H PRN  
 enoxaparin (LOVENOX) injection 40 mg  40 mg SubCUTAneous Q24H  
 insulin regular (NOVOLIN R, HUMULIN R) 100 Units in 0.9% sodium chloride 100 mL infusion  0-50 Units/hr IntraVENous TITRATE Labs: Results:  
   
Chemistry Recent Labs 11/17/18 
1151 11/17/18 
0720 11/17/18 
0420  11/16/18 
0100 * 181* 109*   < > 580*  141 140   < > 129*  
K 3.5 3.8 3.6   < > 4.8 * 114* 114*   < > 90* CO2 19* 17* 17*   < > <5* BUN 2* 3* 5*   < > 20* CREA 0.40* 0.54* 0.45*   < > 1.52* CA 7.5* 7.2* 7.5*   < > 9.1 AGAP 8 10 9   < > Cannot be calculated BUCR 5* 6* 11*   < > 13 AP  --   --   --   --  154* TP  --   --   --   --  10.3* ALB  --   --   --   --  4.7 GLOB  --   --   --   --  5.6* AGRAT  --   --   --   --  0.8  
 < > = values in this interval not displayed. CBC w/Diff Recent Labs 11/17/18 
0420 11/16/18 
0503 11/16/18 
0100 WBC 10.2 20.7* 23.0*  
RBC 3.78* 4.68 5.37* HGB 11.1* 14.3 16.3* HCT 31.3* 41.6 48.9*  
 344 536* GRANS 68 69 62 LYMPH 22 9* 18* EOS 1 0 1 Coagulation No results for input(s): PTP, INR, APTT in the last 72 hours. No lab exists for component: INREXT Liver Enzymes Recent Labs 11/16/18 
0100 TP 10.3* ALB 4.7 * SGOT 13* ABG No results found for: PH, PHI, PCO2, PCO2I, PO2, PO2I, HCO3, HCO3I, FIO2, FIO2I Microbiology No results for input(s): CULT in the last 72 hours. Telemetry: [x]Sinus []A-flutter []Paced []A-fib []Multiple PVCs Imaging: CXR Results  (Last 48 hours)  
          
 11/16/18 0159  XR CHEST SNGL V Final result Impression:  Impression:  
   
Normal study Narrative:  CHEST PORTABLE  
   
CPT CODE: 39611 COMPARISON: None INDICATIONS: Dizziness and shortness of breath. Tachypnea. Hyperglycemia. FINDINGS: Heart and mediastinum are normal. Lungs are clear with no infiltrates. No pleural effusions. No osseous abnormalities. CT Results  (Last 48 hours) None IMPRESSION:  
· DKA, likely 2/2 possible viral illness vs pump malfunction, started on insulin gtt transitioned to [place] insulin on 11/17, tolerating PO, continue monitor. · LUISANA improving · Leukocytosis resolved · Hypophosphatemia PLAN:  
· Resp - Comfortable on RA; supp O2 PRN for SpO2 >94%; aspiration precautions - HOB >30'. Repeat ABG at 0400. Monitor for respiratory decompensation due to acidosis. · ID - no evidence of acute infection, leukocytosis likely reactive, patient may have viral infx that triggered DKA, monitor. · CVS - HD stable currently. Goal MAP >65. Con't fluid resuscitation. Troponin (-). · Heme/Onc - Daily CBC; monitor H/H & Plts. No current issues · Metabolic - electrolyte replacement PRN 
· Renal - Trend renal indices; Strict I/O's; monitor UOP. Cr improving · Endocrine - Glargine 5 units daily with ISS, cover AC/HS 
· Neuro/ Pain/ Sedation - PRN Tylenol; Con't home Prozac when able to PO. Avoid sedation · GI - NPO currently. Pepcid; Zofran PRN for nausea. · Prophylaxis - DVT (SQH), GI (Pepcid) The patient is: [x] acutely ill Risk of deterioration: [x] moderate  
 [] critically ill  [] high  
 
[x]See my orders for details My assessment/plan was discussed with: 
[x]nursing []PT/OT   
[]respiratory therapy []  
[x]family [] [x] Total critical care time spent on reviewing the case/medical record/data/notes/EMR/patient examination/documentation/coordinating care with nurse/consultants, exclusive of procedures was 40 minutes with complex decision making performed and > 50% time spent in face to face evaluation. During this entire length of time I was immediately available to the patient. The reason for providing this level of medical care for this critically ill patient was due a critical illness that impaired one or more vital organ systems such that there was a high probability of imminent or life threatening deterioration in the patients condition. This care involved high complexity decision making to assess, manipulate, and support vital system functions, to treat this degree of vital organ system failure and to prevent further life threatening deterioration of the patients condition Fatimah Villeda MD

## 2018-11-17 NOTE — ROUTINE PROCESS
Bedside and Verbal shift change report given to Vladimir Shah RN (oncoming nurse) by Hernan Khan. Jolene RN (offgoing nurse). Report included the following information SBAR, Kardex, Intake/Output, MAR and Med Rec Status.

## 2018-11-17 NOTE — PROGRESS NOTES
Problem: Falls - Risk of 
Goal: *Absence of Falls Document Wade Huitron Fall Risk and appropriate interventions in the flowsheet. Outcome: Progressing Towards Goal 
Fall Risk Interventions: 
Mobility Interventions: Assess mobility with egress test 
 
  
 
Medication Interventions: Patient to call before getting OOB Elimination Interventions: Call light in reach, Patient to call for help with toileting needs, Toilet paper/wipes in reach, Toileting schedule/hourly rounds Problem: Pressure Injury - Risk of 
Goal: *Prevention of pressure injury Document Bubba Scale and appropriate interventions in the flowsheet. Outcome: Progressing Towards Goal 
Pressure Injury Interventions: Activity Interventions: Assess need for specialty bed Nutrition Interventions: Document food/fluid/supplement intake Friction and Shear Interventions: Apply protective barrier, creams and emollients, Foam dressings/transparent film/skin sealants, HOB 30 degrees or less

## 2018-11-17 NOTE — ROUTINE PROCESS
Asleep on walking rounds voicing no complaints at this time. Glucose Stabilizer continues as ordered with insulin gtt adjusted per protocol. SR up x 2. CB/telephone at hand. 2100  Assisted with the bedpan, voided 450 clear yellow urine. SR up x 2. CB/telephone at hand.

## 2018-11-17 NOTE — CONSULTS
Pt is a 21year old woman with tpe 1 daibetes, who on the day before admission felt dizzy, weak, and nauseated. Her  found her o be lethargic and brought her to the emergency room. She has had diabetes for about five years, and for the last year or so has been on subcutaneous insulin infusion via the V-go pump,prescribed by the endocrinology division at the medical school. This has a fixed basal rate of 20 units per 24 hour period, but allows for boluses. Her glucose levels have been erratic on this regimen. She says she has had \"a sinus infection\" for the last two and  Half weeks. She has taken only Tylenol for this. She says the symptoms went away a week ago, but came bask several days before admission. She admits to facial pain on the right side, , nasal drip and stuffiness. Exam shows her to be afebrile now, with pulse of 99. She has some swelling and erythema of the right cheek. Mouth and throat appear normal.     On admission , her WBC count was 92051, with absolute neutrophil cound of 17,000. A1C is 10.9%. Today, the neutrophil count has come back down to  7,000. Glucose on admission was 335, and bicarb <5, with anion gap of 22. Since admission she has been treated with an insulin infusion adjust by the Gluco stabilizer protocol. Bicarb uickly normalized, and glucose has been in the 100's. She ate lunch and was started on subcutaneous insulin. She received 5 units Lantus, and 6 of Humalog before lunch. Assessment:  DKA. Probable sinus infection. Discussion: Glucose control has been poor on her outpatient regimen, and I suspect she was just tipped over into DKA by the sinus infection. Plan: recommend CT of sinuses and antibiotics. Will treat diabetes with a basal/bolus regimen of 20 units Lantus at bedtime, with Humalog before meals. This will be adjusted daily based on the results of treatment.

## 2018-11-17 NOTE — ROUTINE PROCESS
Bedside and Verbal shift change report given to Nurse Zeyad Archer RN (oncoming nurse) by Morales Plata RN 
 (offgoing nurse). Report included the following information SBAR, Kardex, Procedure Summary, Intake/Output and MAR.

## 2018-11-18 ENCOUNTER — APPOINTMENT (OUTPATIENT)
Dept: CT IMAGING | Age: 23
DRG: 638 | End: 2018-11-18
Attending: INTERNAL MEDICINE
Payer: SELF-PAY

## 2018-11-18 VITALS
SYSTOLIC BLOOD PRESSURE: 118 MMHG | HEART RATE: 100 BPM | HEIGHT: 68 IN | OXYGEN SATURATION: 100 % | RESPIRATION RATE: 23 BRPM | WEIGHT: 115 LBS | DIASTOLIC BLOOD PRESSURE: 80 MMHG | TEMPERATURE: 98.3 F | BODY MASS INDEX: 17.43 KG/M2

## 2018-11-18 LAB
ALBUMIN SERPL-MCNC: 2.8 G/DL (ref 3.4–5)
ALBUMIN/GLOB SERPL: 0.8 {RATIO} (ref 0.8–1.7)
ALP SERPL-CCNC: 88 U/L (ref 45–117)
ALT SERPL-CCNC: 11 U/L (ref 13–56)
ANION GAP SERPL CALC-SCNC: 9 MMOL/L (ref 3–18)
AST SERPL-CCNC: 8 U/L (ref 15–37)
BASOPHILS # BLD: 0 K/UL (ref 0–0.1)
BASOPHILS NFR BLD: 0 % (ref 0–2)
BILIRUB SERPL-MCNC: 0.5 MG/DL (ref 0.2–1)
BUN SERPL-MCNC: 5 MG/DL (ref 7–18)
BUN/CREAT SERPL: 11 (ref 12–20)
CALCIUM SERPL-MCNC: 7.8 MG/DL (ref 8.5–10.1)
CHLORIDE SERPL-SCNC: 108 MMOL/L (ref 100–108)
CO2 SERPL-SCNC: 23 MMOL/L (ref 21–32)
CREAT SERPL-MCNC: 0.46 MG/DL (ref 0.6–1.3)
DIFFERENTIAL METHOD BLD: ABNORMAL
EOSINOPHIL # BLD: 0.1 K/UL (ref 0–0.4)
EOSINOPHIL NFR BLD: 1 % (ref 0–5)
ERYTHROCYTE [DISTWIDTH] IN BLOOD BY AUTOMATED COUNT: 12.8 % (ref 11.6–14.5)
GLOBULIN SER CALC-MCNC: 3.6 G/DL (ref 2–4)
GLUCOSE BLD STRIP.AUTO-MCNC: 209 MG/DL (ref 70–110)
GLUCOSE BLD STRIP.AUTO-MCNC: 301 MG/DL (ref 70–110)
GLUCOSE SERPL-MCNC: 269 MG/DL (ref 74–99)
HCT VFR BLD AUTO: 33.2 % (ref 35–45)
HGB BLD-MCNC: 11.9 G/DL (ref 12–16)
LYMPHOCYTES # BLD: 2.4 K/UL (ref 0.9–3.6)
LYMPHOCYTES NFR BLD: 41 % (ref 21–52)
MCH RBC QN AUTO: 30.1 PG (ref 24–34)
MCHC RBC AUTO-ENTMCNC: 35.8 G/DL (ref 31–37)
MCV RBC AUTO: 83.8 FL (ref 74–97)
MONOCYTES # BLD: 0.5 K/UL (ref 0.05–1.2)
MONOCYTES NFR BLD: 8 % (ref 3–10)
NEUTS SEG # BLD: 3 K/UL (ref 1.8–8)
NEUTS SEG NFR BLD: 50 % (ref 40–73)
PH BLDV: 7.4 [PH] (ref 7.32–7.42)
PHOSPHATE SERPL-MCNC: 3.6 MG/DL (ref 2.5–4.9)
PLATELET # BLD AUTO: 238 K/UL (ref 135–420)
PMV BLD AUTO: 9.3 FL (ref 9.2–11.8)
POTASSIUM SERPL-SCNC: 4.2 MMOL/L (ref 3.5–5.5)
PROT SERPL-MCNC: 6.4 G/DL (ref 6.4–8.2)
RBC # BLD AUTO: 3.96 M/UL (ref 4.2–5.3)
SODIUM SERPL-SCNC: 140 MMOL/L (ref 136–145)
WBC # BLD AUTO: 6 K/UL (ref 4.6–13.2)

## 2018-11-18 PROCEDURE — 84681 ASSAY OF C-PEPTIDE: CPT

## 2018-11-18 PROCEDURE — 84100 ASSAY OF PHOSPHORUS: CPT

## 2018-11-18 PROCEDURE — 74011000250 HC RX REV CODE- 250: Performed by: PHYSICIAN ASSISTANT

## 2018-11-18 PROCEDURE — 70486 CT MAXILLOFACIAL W/O DYE: CPT

## 2018-11-18 PROCEDURE — 74011250636 HC RX REV CODE- 250/636: Performed by: PHYSICIAN ASSISTANT

## 2018-11-18 PROCEDURE — 74011250636 HC RX REV CODE- 250/636: Performed by: INTERNAL MEDICINE

## 2018-11-18 PROCEDURE — 74011250636 HC RX REV CODE- 250/636: Performed by: NURSE PRACTITIONER

## 2018-11-18 PROCEDURE — 82962 GLUCOSE BLOOD TEST: CPT

## 2018-11-18 PROCEDURE — 74011250637 HC RX REV CODE- 250/637: Performed by: INTERNAL MEDICINE

## 2018-11-18 PROCEDURE — 74011636637 HC RX REV CODE- 636/637: Performed by: INTERNAL MEDICINE

## 2018-11-18 PROCEDURE — 74011000250 HC RX REV CODE- 250: Performed by: NURSE PRACTITIONER

## 2018-11-18 PROCEDURE — 36415 COLL VENOUS BLD VENIPUNCTURE: CPT

## 2018-11-18 PROCEDURE — 85025 COMPLETE CBC W/AUTO DIFF WBC: CPT

## 2018-11-18 PROCEDURE — 82800 BLOOD PH: CPT

## 2018-11-18 PROCEDURE — 80053 COMPREHEN METABOLIC PANEL: CPT

## 2018-11-18 RX ORDER — AMOXICILLIN 875 MG/1
875 TABLET, FILM COATED ORAL 2 TIMES DAILY
Qty: 12 TAB | Refills: 0 | Status: SHIPPED | OUTPATIENT
Start: 2018-11-18 | End: 2018-11-18

## 2018-11-18 RX ORDER — INSULIN ASPART 100 [IU]/ML
INJECTION, SOLUTION INTRAVENOUS; SUBCUTANEOUS
Qty: 1500 UNITS | Refills: 1 | Status: SHIPPED | OUTPATIENT
Start: 2018-11-18

## 2018-11-18 RX ORDER — INSULIN GLARGINE 100 [IU]/ML
20 INJECTION, SOLUTION SUBCUTANEOUS
Qty: 1 VIAL | Refills: 12 | Status: SHIPPED | OUTPATIENT
Start: 2018-11-18

## 2018-11-18 RX ORDER — AMOXICILLIN AND CLAVULANATE POTASSIUM 875; 125 MG/1; MG/1
1 TABLET, FILM COATED ORAL 2 TIMES DAILY
Qty: 14 TAB | Refills: 0 | Status: SHIPPED | OUTPATIENT
Start: 2018-11-18 | End: 2018-11-25

## 2018-11-18 RX ADMIN — FAMOTIDINE 20 MG: 10 INJECTION, SOLUTION INTRAVENOUS at 08:49

## 2018-11-18 RX ADMIN — ENOXAPARIN SODIUM 40 MG: 100 INJECTION SUBCUTANEOUS at 12:21

## 2018-11-18 RX ADMIN — INSULIN LISPRO 6 UNITS: 100 INJECTION, SOLUTION INTRAVENOUS; SUBCUTANEOUS at 12:22

## 2018-11-18 RX ADMIN — POTASSIUM PHOSPHATE, MONOBASIC AND POTASSIUM PHOSPHATE, DIBASIC: 224; 236 INJECTION, SOLUTION INTRAVENOUS at 01:22

## 2018-11-18 RX ADMIN — INSULIN LISPRO 8 UNITS: 100 INJECTION, SOLUTION INTRAVENOUS; SUBCUTANEOUS at 08:49

## 2018-11-18 RX ADMIN — POTASSIUM CHLORIDE 20 MEQ: 1.5 POWDER, FOR SOLUTION ORAL at 08:49

## 2018-11-18 NOTE — PROGRESS NOTES
NUTRITION Nursing Referral: [facility] 
  
RECOMMENDATIONS / PLAN:  
 
- Continue current nutrition interventions. - Diabetic diet education provided today 
- Continue RD inpatient monitoring and evaluation. NUTRITION INTERVENTIONS & DIAGNOSIS:  
 
[x] Meals/snacks: modify composition 
[x] Nutrition education/counseling: provide diabetic diet education provided 11/18/18 Nutrition Diagnosis: Underweight related to inadequate energy intake, undesirable food choices and uncontrolled diabetes as evidenced by BMI of 17.5 kg/m^2. ASSESSMENT:  
 
11/18: Pt with good meal intake this am, feeling better and tolerating diet. Receiving lantus, SSI, and diabetic diet. Diabetic diet education provided to pt today, pt receptive. 11/16: Admitted for DKA started on insulin drip and fluid resuscitation. Remains NPO and receiving IV fluid, still dry but Crea trending down. C/o nausea/vomting and weakness that started the day of admission but eating well with good appetite before then. States she follows some diabetic diet restrictions but agreeable to education once feeling better, prior to discharge. Average po intake adequate to meet patients estimated nutritional needs:   [x] Yes     [] No   [] Unable to determine at this time Diet: DIET DIABETIC CONSISTENT CARB Regular Food Allergies: NKFA Current Appetite:   [x] Good     [] Fair     [] Poor     [] Other Appetite/meal intake prior to admission:   [x] Good usually, prior to day of admission     [] Fair     [] Poor     [x] Other: c/o nausea/abdominal pain and episode of emesis day of admission Feeding Limitations:  [] Swallowing difficulty    [] Chewing difficulty    [] Other: 
Current Meal Intake:  
Patient Vitals for the past 100 hrs: 
 % Diet Eaten 11/18/18 0900 95 % 11/17/18 1900 50 % 11/17/18 1300 50 % BM: 11/15 Skin Integrity: WDL Edema:   [x] No     [] Yes Pertinent Medications: Reviewed: colace, lantus, SSI, zofran Recent Labs 11/18/18 
0500 11/17/18 
1946 11/17/18 
1151 11/17/18 
0720  11/16/18 
0100   --  141 141   < > 129*  
K 4.2 3.8 3.5 3.8   < > 4.8   --  114* 114*   < > 90* CO2 23  --  19* 17*   < > <5* *  --  119* 181*   < > 580* BUN 5*  --  2* 3*   < > 20* CREA 0.46*  --  0.40* 0.54*   < > 1.52* CA 7.8*  --  7.5* 7.2*   < > 9.1 MG  --  2.5 1.8 1.9   < > 2.3 PHOS 3.6 1.5* 1.8* 2.3*   < > 9.3* ALB 2.8*  --   --   --   --  4.7 SGOT 8*  --   --   --   --  13* ALT 11*  --   --   --   --  18  
 < > = values in this interval not displayed. Intake/Output Summary (Last 24 hours) at 11/18/2018 1204 Last data filed at 11/18/2018 0900 Gross per 24 hour Intake 2363.63 ml Output 3800 ml Net -1436.37 ml Anthropometrics: 
Ht Readings from Last 1 Encounters:  
11/16/18 5' 8\" (1.727 m) Last 3 Recorded Weights in this Encounter 11/16/18 0043 11/16/18 0515 Weight: 52.2 kg (115 lb) 52.2 kg (115 lb) Body mass index is 17.49 kg/m². Underweight Weight History: pt reports usual weight of 118 lb and unsure of change in weight PTA; 3 lb, 2.5% weight loss x year per chart Weight Metrics 11/16/2018 8/17/2017 4/13/2017 Weight 115 lb 118 lb 5 oz 118 lb BMI 17.49 kg/m2 17.99 kg/m2 17.94 kg/m2 Admitting Diagnosis: DKA (diabetic ketoacidoses) ([facility] Pertinent PMHx: diabetes, depression, anxiety Education Needs:        [] None identified  [] Identified - Not appropriate at this time  [x]  Identified and addressed - refer to education log Learning Limitations:   [x] None identified  [] Identified Cultural, Rastafari & ethnic food preferences:  [x] None identified    [] Identified and addressed ESTIMATED NUTRITION NEEDS:  
 
Calories: 8897-2933 kcal (MSJx1.2-1.5) based on  [x] Actual BW 52 kg     [] IBW Protein: 46-62 gm (0.8-1.2 gm/kg) based on  [x] Actual BW      [] IBW Fluid: 1 mL/kcal 
  
MONITORING & EVALUATION:  
 
Nutrition Goal(s):  
 1. Po intake of meals will meet >75% of patient estimated nutritional needs within the next 7 days. Outcome:  [x] Met/Ongoing    []  Not Met    [] New/Initial Goal 
2. Patient will increase knowledge of appropriate food choices on a diabetic diet within 7 days. Outcome:  [x] Met    []  Not Met    [] New/Initial Goal  
3. Weight gain of 1-2 lbs over the next 7 days. Outcome:  [] Met/Ongoing    []  Not Met    [x] New/Initial Goal  
 
Monitoring:   [x] Food and beverage intake   [x] Diet order   [x] Nutrition-focused physical findings   [x] Treatment/therapy   [] Weight   [] Enteral nutrition intake Previous Recommendations (for follow-up assessments only):     [x]   Implemented       []   Not Implemented (RD to address)      [] No Longer Appropriate     [] No Recommendation Made Discharge Planning: diabetic diet as tolerated  
[x] Participated in care planning, discharge planning, & interdisciplinary rounds as appropriate Queen Cora RD Pager: [phone]

## 2018-11-18 NOTE — ROUTINE PROCESS
Bedside and Verbal shift change report given to Zeyad Archer RN (oncoming nurse) by AILYN Hernandez RN (offgoing nurse). Report included the following information SBAR, Kardex, Intake/Output, MAR, Recent Results and Cardiac Rhythm . David Reed

## 2018-11-18 NOTE — PROGRESS NOTES
Reason for Admission:   DKA RRAT Score:          2 Plan for utilizing home health:     no  
                 
Likelihood of Readmission:  Green/low Transition of Care Plan:  Pt is AOx4 and her  Ayesha Mccarty at bedside. Pt reported she was independent prior to admission and has no DME. She states whenever she has to see pcp, she goes to [facility] and she is ok with that for now. She does not have any concerns going back home. Her  will be taking her home when discharged. Indigent med prescriptions have been faxed to [address] [place] [address]. Informed pt and pt's nurse. Care Management Interventions PCP Verified by CM: No 
Palliative Care Criteria Met (RRAT>21 & CHF Dx)?: No 
Mode of Transport at Discharge: Other (see comment)(spouse) Transition of Care Consult (CM Consult): Discharge Planning Physical Therapy Consult: No 
Occupational Therapy Consult: No 
Speech Therapy Consult: No 
Current Support Network: Lives with Spouse Confirm Follow Up Transport: Family Plan discussed with Pt/Family/Caregiver: Yes Discharge Location Discharge Placement: Home

## 2018-11-18 NOTE — DISCHARGE INSTRUCTIONS
Please make sure you get your insulin on date of discharge and take a dose of lantus when next due today (to be given info by nurse). Also take the full course of your antibiotics for sinus infection even if you feel better before you finish the course of antibiotics. Return to ED or call your doctor if you start having worsening fevers, nausea/vomiting, thick mucus discharge from your nose, or going down the back of your throat.

## 2018-11-18 NOTE — ROUTINE PROCESS
TRANSFER - OUT REPORT: 
 
Verbal report given to Aliya(name) on Marisela Fitzgerald  being transferred to [address] [place] [address](unit) for routine progression of care Report consisted of patients Situation, Background, Assessment and  
Recommendations(SBAR). Information from the following report(s) SBAR, Kardex, Intake/Output, Recent Results and Cardiac Rhythm . was reviewed with the receiving nurse. Lines:  
Peripheral IV 11/16/18 Left Antecubital (Active) Site Assessment Clean, dry, & intact 11/17/2018  1:00 PM  
Phlebitis Assessment 0 11/17/2018  1:00 PM  
Infiltration Assessment 0 11/17/2018  1:00 PM  
Dressing Status Clean, dry, & intact 11/17/2018  1:00 PM  
Dressing Type Transparent 11/17/2018  1:00 PM  
Hub Color/Line Status Blue;Patent; Flushed 11/17/2018  1:00 PM  
Action Taken Open ports on tubing capped 11/17/2018  1:00 PM  
Alcohol Cap Used Yes 11/17/2018  1:00 PM  
   
Peripheral IV 11/16/18 Right Antecubital (Active) Site Assessment Clean, dry, & intact 11/17/2018  5:00 PM  
Phlebitis Assessment 2 11/17/2018  5:00 PM  
Infiltration Assessment 1 11/17/2018  5:00 PM  
Dressing Status Clean, dry, & intact 11/17/2018  5:00 PM  
Dressing Type Transparent;Tape 11/17/2018  5:00 PM  
Hub Color/Line Status Pink 11/17/2018  5:00 PM  
Action Taken Other (comment) 11/17/2018  5:00 PM  
Alcohol Cap Used No 11/17/2018  5:00 PM  
   
Peripheral IV 11/17/18 Distal;Lower; Posterior;Right Forearm (Active) Opportunity for questions and clarification was provided. Patient transported with: 
 [employer] [facility] staff with  and pt belongings

## 2018-11-18 NOTE — ROUTINE PROCESS
Bedside and Verbal shift change report given to Chris Leon RN (oncoming nurse) by Abelardo Fernández RN (offgoing nurse). Report included the following information SBAR, Kardex, Intake/Output, MAR and Recent Results.

## 2018-11-18 NOTE — DISCHARGE SUMMARY
[facility]  Discharge Summary       Patient: Nam Andre Age: 21 y.o. : 1995 MR#: [id number] [id number]: [id number]  PCP on record: Marylu Mondragon MD  Admit date: 2018  Discharge date: 2018    Consults:  -AME Suarez,-endocrine  - [facility]- [facility]  Procedures: none  -     Significant Diagnostic Studies: CT of the sinuses 18: IMPRESSION  IMPRESSION: Occluded right OMC, the right maxillary sinus is completely  opacified. The remaining paranasal sinuses are clear. Minor mucosal thickening  in the single right ethmoid air cell. -    Discharge Diagnoses:  -DKA-resolved  -LUISANA -resolved  -Leukocytosis-resolved  -Sinusitis                                        Patient Active Problem List   Diagnosis Code    DKA (diabetic ketoacidoses) ([facility] [address] E13.10    Acute renal failure (ARF) ([facility]) N17.9    Depression F32.9    Hyponatremia E87.1    Leukocytosis D72.829    Acidotic hyperventilation R06.4       Hospital Course by Problem     21  y o female w/ h/o type 1 DM on insulin pump presented to the ED w/ cc of SOB and dizziness. She was noted to have evidence of DKA and was admitted to the ICU for further eval and management. She was given insulin drip per protocol and hydrated. Her gap has now closed. She was seen by the endocrinologist service and recommendations were made to stop her insulin infusion via V-go pump and to use lantus and corrective insulin instead. Of note, she had complaints of nasal congestion and sinus pressures during her stay here. No clear reports of fever, sinus drainage however. She has had CT of sinuses done on date of dc, results pasted above. She will be dc'd w/ a course of abx to treat acute sinusitis. Of note her leukocytosis and bandemia resolved w/o abx rx. She did have increase in her creat which has gone back to baseline levels. She was also given potassium supplements.           Today's examination of the patient revealed:     Subjective:   Pt reports she feels better. Objective:   VS:   Visit Vitals  /80   Pulse 100   Temp 98.3 °F (36.8 °C)   Resp 23   Ht 5' 8\" (1.727 m)   Wt 52.2 kg (115 lb)   SpO2 100%   Breastfeeding?  No   BMI 17.49 kg/m²      Tmax/24hrs: Temp (24hrs), Av.4 °F (36.9 °C), Min:98.2 °F (36.8 °C), Max:98.5 °F (36.9 °C)     Input/Output:     Intake/Output Summary (Last 24 hours) at 2018 1124  Last data filed at 2018 0900  Gross per 24 hour   Intake 2489.36 ml   Output 3800 ml   Net -1310.64 ml       General:  Alert, awake, in nad  Cardiovascular:  Rrr, no murmurs  Pulmonary:  ctab  GI: soft, nt, nd   Extremities:  No edema  Additional:      Labs:    Recent Results (from the past 24 hour(s))   GLUCOSE, POC    Collection Time: 18 11:46 AM   Result Value Ref Range    Glucose (POC) 123 (H) 70 - 110 mg/dL   GLUCOSTABILIZER    Collection Time: 18 11:47 AM   Result Value Ref Range    Glucose 123 mg/dL    Insulin order 0.3 units/hour    Insulin adminstered 0.3 units/hour    Multiplier 0.005     Low target 140 mg/dL    High target 180 mg/dL    D50 order 0.0 ml    D50 administered 0.00 ml    Minutes until next BG 60 min    Order initials chp     Administered initials chp    MAGNESIUM    Collection Time: 18 11:51 AM   Result Value Ref Range    Magnesium 1.8 1.6 - 2.6 mg/dL   PHOSPHORUS    Collection Time: 18 11:51 AM   Result Value Ref Range    Phosphorus 1.8 (L) 2.5 - 4.9 MG/DL   METABOLIC PANEL, BASIC    Collection Time: 18 11:51 AM   Result Value Ref Range    Sodium 141 136 - 145 mmol/L    Potassium 3.5 3.5 - 5.5 mmol/L    Chloride 114 (H) 100 - 108 mmol/L    CO2 19 (L) 21 - 32 mmol/L    Anion gap 8 3.0 - 18 mmol/L    Glucose 119 (H) 74 - 99 mg/dL    BUN 2 (L) 7.0 - 18 MG/DL    Creatinine 0.40 (L) 0.6 - 1.3 MG/DL    BUN/Creatinine ratio 5 (L) 12 - 20      GFR est AA >60 >60 ml/min/1.73m2    GFR est non-AA >60 >60 ml/min/1.73m2    Calcium 7.5 (L) 8.5 - 10.1 MG/DL HEMOGLOBIN A1C WITH EAG    Collection Time: 11/17/18 11:51 AM   Result Value Ref Range    Hemoglobin A1c 10.9 (H) 4.2 - 5.6 %    Est. average glucose 266 mg/dL   GLUCOSE, POC    Collection Time: 11/17/18  1:36 PM   Result Value Ref Range    Glucose (POC) 261 (H) 70 - 110 mg/dL   GLUCOSTABILIZER    Collection Time: 11/17/18  1:38 PM   Result Value Ref Range    Glucose 261 mg/dL    Insulin order 3.0 units/hour    Insulin adminstered 3.0 units/hour    Multiplier 0.015     Low target 140 mg/dL    High target 180 mg/dL    D50 order 0.0 ml    D50 administered 0.00 ml    Minutes until next BG 60 min    Order initials chp     Administered initials chp    GLUCOSE, POC    Collection Time: 11/17/18  3:24 PM   Result Value Ref Range    Glucose (POC) 184 (H) 70 - 110 mg/dL   GLUCOSE, POC    Collection Time: 11/17/18  4:46 PM   Result Value Ref Range    Glucose (POC) 181 (H) 70 - 110 mg/dL   CBC WITH AUTOMATED DIFF    Collection Time: 11/17/18  7:46 PM   Result Value Ref Range    WBC 9.3 4.6 - 13.2 K/uL    RBC 3.96 (L) 4.20 - 5.30 M/uL    HGB 11.8 (L) 12.0 - 16.0 g/dL    HCT 32.9 (L) 35.0 - 45.0 %    MCV 83.1 74.0 - 97.0 FL    MCH 29.8 24.0 - 34.0 PG    MCHC 35.9 31.0 - 37.0 g/dL    RDW 12.6 11.6 - 14.5 %    PLATELET 412 666 - 072 K/uL    MPV 8.9 (L) 9.2 - 11.8 FL    NEUTROPHILS 59 40 - 73 %    LYMPHOCYTES 32 21 - 52 %    MONOCYTES 8 3 - 10 %    EOSINOPHILS 1 0 - 5 %    BASOPHILS 0 0 - 2 %    ABS. NEUTROPHILS 5.5 1.8 - 8.0 K/UL    ABS. LYMPHOCYTES 3.0 0.9 - 3.6 K/UL    ABS. MONOCYTES 0.7 0.05 - 1.2 K/UL    ABS. EOSINOPHILS 0.1 0.0 - 0.4 K/UL    ABS.  BASOPHILS 0.0 0.0 - 0.1 K/UL    DF AUTOMATED     MAGNESIUM    Collection Time: 11/17/18  7:46 PM   Result Value Ref Range    Magnesium 2.5 1.6 - 2.6 mg/dL   POTASSIUM    Collection Time: 11/17/18  7:46 PM   Result Value Ref Range    Potassium 3.8 3.5 - 5.5 mmol/L   PHOSPHORUS    Collection Time: 11/17/18  7:46 PM   Result Value Ref Range    Phosphorus 1.5 (L) 2.5 - 4.9 MG/DL   CBC WITH AUTOMATED DIFF    Collection Time: 11/18/18  5:00 AM   Result Value Ref Range    WBC 6.0 4.6 - 13.2 K/uL    RBC 3.96 (L) 4.20 - 5.30 M/uL    HGB 11.9 (L) 12.0 - 16.0 g/dL    HCT 33.2 (L) 35.0 - 45.0 %    MCV 83.8 74.0 - 97.0 FL    MCH 30.1 24.0 - 34.0 PG    MCHC 35.8 31.0 - 37.0 g/dL    RDW 12.8 11.6 - 14.5 %    PLATELET 203 091 - 053 K/uL    MPV 9.3 9.2 - 11.8 FL    NEUTROPHILS 50 40 - 73 %    LYMPHOCYTES 41 21 - 52 %    MONOCYTES 8 3 - 10 %    EOSINOPHILS 1 0 - 5 %    BASOPHILS 0 0 - 2 %    ABS. NEUTROPHILS 3.0 1.8 - 8.0 K/UL    ABS. LYMPHOCYTES 2.4 0.9 - 3.6 K/UL    ABS. MONOCYTES 0.5 0.05 - 1.2 K/UL    ABS. EOSINOPHILS 0.1 0.0 - 0.4 K/UL    ABS. BASOPHILS 0.0 0.0 - 0.1 K/UL    DF AUTOMATED     METABOLIC PANEL, COMPREHENSIVE    Collection Time: 11/18/18  5:00 AM   Result Value Ref Range    Sodium 140 136 - 145 mmol/L    Potassium 4.2 3.5 - 5.5 mmol/L    Chloride 108 100 - 108 mmol/L    CO2 23 21 - 32 mmol/L    Anion gap 9 3.0 - 18 mmol/L    Glucose 269 (H) 74 - 99 mg/dL    BUN 5 (L) 7.0 - 18 MG/DL    Creatinine 0.46 (L) 0.6 - 1.3 MG/DL    BUN/Creatinine ratio 11 (L) 12 - 20      GFR est AA >60 >60 ml/min/1.73m2    GFR est non-AA >60 >60 ml/min/1.73m2    Calcium 7.8 (L) 8.5 - 10.1 MG/DL    Bilirubin, total 0.5 0.2 - 1.0 MG/DL    ALT (SGPT) 11 (L) 13 - 56 U/L    AST (SGOT) 8 (L) 15 - 37 U/L    Alk.  phosphatase 88 45 - 117 U/L    Protein, total 6.4 6.4 - 8.2 g/dL    Albumin 2.8 (L) 3.4 - 5.0 g/dL    Globulin 3.6 2.0 - 4.0 g/dL    A-G Ratio 0.8 0.8 - 1.7     PHOSPHORUS    Collection Time: 11/18/18  5:00 AM   Result Value Ref Range    Phosphorus 3.6 2.5 - 4.9 MG/DL   PH, VENOUS    Collection Time: 11/18/18  5:00 AM   Result Value Ref Range    VENOUS PH 7.40 7.32 - 7.42     GLUCOSE, POC    Collection Time: 11/18/18  8:22 AM   Result Value Ref Range    Glucose (POC) 301 (H) 70 - 110 mg/dL     Additional Data Reviewed:     Condition: stable  Disposition:    [x]Home   []Home with Home Health   []SNF/NH   []Rehab []Home with family   []Alternate Facility:____________________      Discharge Medications:     Current Discharge Medication List      START taking these medications    Details   insulin glargine (LANTUS) 100 unit/mL injection 20 Units by SubCUTAneous route nightly. Qty: 1 Vial, Refills: 12      insulin aspart U-100 (NOVOLOG) 100 unit/mL inpn INITIATE INSULIN CORRECTIVE PROTOCOL: Normal Insulin Sensitivity   For Blood Sugar (mg/dL) of:       =     2 units             120 -150 =   4 units  151-199= 5 units  200 -249 =   6 units  250 -299 =   7units  300 -349 =   8 units  350 -400 =   10 units  Above 400 = 12 units  Qty: 1500 Units, Refills: 1      Syringe, Disposable, 1 mL syrg 1 Syringe by Does Not Apply route daily. Qty: 120 Syringe, Refills: 1      amox calv 875-125 1 po bid X 7 days   CONTINUE these medications which have NOT CHANGED    Details   FLUoxetine (PROZAC) 20 mg capsule Take 20 mg by mouth daily. Refills: 10      lisinopril (PRINIVIL, ZESTRIL) 2.5 mg tablet Take  by mouth daily. norethindrone-ethinyl estradiol (JUNEL 1/20, 21,) 1-20 mg-mcg tab Take  by mouth. oxyCODONE-acetaminophen (PERCOCET 7.5) 7.5-325 mg per tablet Take 1 Tab by mouth every four (4) hours as needed for Pain. Max Daily Amount: 6 Tabs. Qty: 36 Tab, Refills: 0      ondansetron (ZOFRAN ODT) 4 mg disintegrating tablet Take 1 Tab by mouth every eight (8) hours as needed for Nausea. Qty: 6 Tab, Refills: 0         STOP taking these medications        insulin pump (PATIENT SUPPLIED) misc Comments:   Reason for Stopping: Follow-up Appointments:   1. Your PCP: Marylu Mondragon MD, within 7-10days  2.  Endocrinologist in 1-2 wks         >30 minutes spent coordinating this discharge (review instructions/follow-up, prescriptions, preparing report for sign off)    Signed:  Milan Fitzgerald MD  11/18/2018  11:24 AM

## 2018-11-18 NOTE — ROUTINE PROCESS
Patient is awake and oriented x 4.  at bedside. No complaints voiced at this time. SR up x 2. CB/telephone at hand.

## 2018-11-21 LAB — GAD65 AB SER-ACNC: 529 U/ML (ref 0–5)

## 2018-11-28 LAB — C PEPTIDE SERPL-MCNC: NORMAL NG/ML

## 2020-08-03 NOTE — PROGRESS NOTES
Pt is awake and pleasant this morning  Reports sleeping well, feels the addition of clonazepam BID has been very helpful, pt is less anxious  Hopeful to discharge to take son to doctor appt tomorrow  Denies SI, HI, AVH  Compliant with blood sugar checks and medications  [facility] Progress Note Patient: Kerry Alicea Age: 21 y.o. : 1995 MR#: [id number] SSN: [id number] Date/Time: 2018 Subjective:  
 
Pt states she feels better, she wants to eat.  present at bedside. Assessment/Plan:  
-DKA-improving 
-LUISANA-resolving 
-Leukocytosis w/ bandemia no clear evidence of infection as cause at this time-resolved. -History of type 1 DM on insulin pump prior to admission. Mentions some type of malfunction where she cannot use manual over ride feature to administer insulin, and possible issues w/ how instrument attaches. Endocrine consulted. -Hypophosphatemia in setting of DKA improving. PLAN: 
- transition to sub q insulin 
-cont to hydrate Additional Notes:   
 
Case discussed with:  [x]Patient  [x]Family  []Nursing  []Case Management DVT Prophylaxis:  []Lovenox  []Hep SQ  []SCDs  []Coumadin   []On Heparin gtt Objective:  
VS:  
Visit Vitals /78 Pulse (!) 104 Temp 98.9 °F (37.2 °C) Resp 20 Ht 5' 8\" (1.727 m) Wt 52.2 kg (115 lb) SpO2 100% Breastfeeding? No  
BMI 17.49 kg/m² Tmax/24hrs: Temp (24hrs), Av.7 °F (37.1 °C), Min:98.2 °F (36.8 °C), Max:98.9 °F (37.2 °C) Input/Output:  
 
Intake/Output Summary (Last 24 hours) at 2018 1124 Last data filed at 2018 0700 Gross per 24 hour Intake 3833.17 ml Output 1950 ml Net 1883.17 ml General:  Alert, awake, in nad Cardiovascular:  Rrr, no murmurs Pulmonary:  ctab GI:  Soft, nt, nd 
Extremities:  No edema Additional:  Dry mm Labs:   
Recent Results (from the past 24 hour(s)) GLUCOSE, POC Collection Time: 18 12:09 PM  
Result Value Ref Range Glucose (POC) 199 (H) 70 - 110 mg/dL Ashley De La Rosa Collection Time: 18 12:09 PM  
Result Value Ref Range Glucose 199 mg/dL Insulin order 9.7 units/hour Insulin adminstered 9.7 units/hour  Multiplier 0.070   
 Low target 140 mg/dL High target 180 mg/dL D50 order 0.0 ml  
 D50 administered 0.00 ml Minutes until next BG 60 min Order initials bmw Administered initials bmw GLUCOSE, POC Collection Time: 11/16/18  1:17 PM  
Result Value Ref Range Glucose (POC) 158 (H) 70 - 110 mg/dL Randy Plume Collection Time: 11/16/18  1:18 PM  
Result Value Ref Range Glucose 158 mg/dL Insulin order 6.9 units/hour Insulin adminstered 6.9 units/hour Multiplier 0.070 Low target 140 mg/dL High target 180 mg/dL D50 order 0.0 ml  
 D50 administered 0.00 ml Minutes until next BG 60 min Order initials bmw Administered initials bmw GLUCOSE, POC Collection Time: 11/16/18  2:11 PM  
Result Value Ref Range Glucose (POC) 138 (H) 70 - 110 mg/dL Randy Plume Collection Time: 11/16/18  2:12 PM  
Result Value Ref Range Glucose 138 mg/dL Insulin order 4.4 units/hour Insulin adminstered 4.4 units/hour Multiplier 0.056 Low target 140 mg/dL High target 180 mg/dL D50 order 0.0 ml  
 D50 administered 0.00 ml Minutes until next BG 60 min Order initials bmw Administered initials bmw GLUCOSE, POC Collection Time: 11/16/18  3:17 PM  
Result Value Ref Range Glucose (POC) 105 70 - 110 mg/dL Randy Plume Collection Time: 11/16/18  3:17 PM  
Result Value Ref Range Glucose 105 mg/dL Insulin order 2.0 units/hour Insulin adminstered 2.0 units/hour Multiplier 0.045 Low target 140 mg/dL High target 180 mg/dL D50 order 0.0 ml  
 D50 administered 0.00 ml Minutes until next BG 60 min Order initials bmw Administered initials bmw METABOLIC PANEL, BASIC Collection Time: 11/16/18  3:24 PM  
Result Value Ref Range Sodium 142 136 - 145 mmol/L Potassium 3.8 3.5 - 5.5 mmol/L Chloride 116 (H) 100 - 108 mmol/L  
 CO2 17 (L) 21 - 32 mmol/L Anion gap 9 3.0 - 18 mmol/L Glucose 87 74 - 99 mg/dL BUN 13 7.0 - 18 MG/DL Creatinine 0.78 0.6 - 1.3 MG/DL  
 BUN/Creatinine ratio 17 12 - 20 GFR est AA >60 >60 ml/min/1.73m2 GFR est non-AA >60 >60 ml/min/1.73m2 Calcium 7.3 (L) 8.5 - 10.1 MG/DL MAGNESIUM Collection Time: 11/16/18  3:24 PM  
Result Value Ref Range Magnesium 2.6 1.6 - 2.6 mg/dL PHOSPHORUS Collection Time: 11/16/18  3:24 PM  
Result Value Ref Range Phosphorus 1.4 (L) 2.5 - 4.9 MG/DL  
GLUCOSE, POC Collection Time: 11/16/18  4:18 PM  
Result Value Ref Range Glucose (POC) 105 70 - 110 mg/dL Lindsay Casa Collection Time: 11/16/18  4:18 PM  
Result Value Ref Range Glucose 105 mg/dL Insulin order 1.6 units/hour Insulin adminstered 1.6 units/hour Multiplier 0.035 Low target 140 mg/dL High target 180 mg/dL D50 order 0.0 ml  
 D50 administered 0.00 ml Minutes until next BG 60 min Order initials bmw Administered initials bmw GLUCOSE, POC Collection Time: 11/16/18  5:21 PM  
Result Value Ref Range Glucose (POC) 100 70 - 110 mg/dL Lindsay Casa Collection Time: 11/16/18  5:21 PM  
Result Value Ref Range Glucose 100 mg/dL Insulin order 1.0 units/hour Insulin adminstered 1.0 units/hour Multiplier 0.025 Low target 140 mg/dL High target 180 mg/dL D50 order 0.0 ml  
 D50 administered 0.00 ml Minutes until next BG 60 min Order initials bmw Administered initials bmw GLUCOSE, POC Collection Time: 11/16/18  6:18 PM  
Result Value Ref Range Glucose (POC) 114 (H) 70 - 110 mg/dL Lindsay Casa Collection Time: 11/16/18  6:19 PM  
Result Value Ref Range Glucose 114 mg/dL Insulin order 0.8 units/hour Insulin adminstered 0.8 units/hour Multiplier 0.015 Low target 140 mg/dL High target 180 mg/dL D50 order 0.0 ml  
 D50 administered 0.00 ml Minutes until next BG 60 min Order initials bmw Administered initials bmw GLUCOSE, POC  
 Collection Time: 11/16/18  7:06 PM  
Result Value Ref Range Glucose (POC) 124 (H) 70 - 110 mg/dL [facility] Collection Time: 11/16/18  7:07 PM  
Result Value Ref Range Glucose 124 mg/dL Insulin order 0.3 units/hour Insulin adminstered 0.3 units/hour Multiplier 0.005 Low target 140 mg/dL High target 180 mg/dL D50 order 0.0 ml  
 D50 administered 0.00 ml Minutes until next BG 60 min Order initials bmw Administered initials bmw MAGNESIUM Collection Time: 11/16/18  8:05 PM  
Result Value Ref Range Magnesium 2.3 1.6 - 2.6 mg/dL METABOLIC PANEL, BASIC Collection Time: 11/16/18  8:05 PM  
Result Value Ref Range Sodium 139 136 - 145 mmol/L Potassium 3.8 3.5 - 5.5 mmol/L Chloride 113 (H) 100 - 108 mmol/L  
 CO2 15 (L) 21 - 32 mmol/L Anion gap 11 3.0 - 18 mmol/L Glucose 157 (H) 74 - 99 mg/dL BUN 11 7.0 - 18 MG/DL Creatinine 0.66 0.6 - 1.3 MG/DL  
 BUN/Creatinine ratio 17 12 - 20 GFR est AA >60 >60 ml/min/1.73m2 GFR est non-AA >60 >60 ml/min/1.73m2 Calcium 7.1 (L) 8.5 - 10.1 MG/DL  
PHOSPHORUS Collection Time: 11/16/18  8:05 PM  
Result Value Ref Range Phosphorus 2.4 (L) 2.5 - 4.9 MG/DL  
GLUCOSE, POC Collection Time: 11/16/18  8:13 PM  
Result Value Ref Range Glucose (POC) 157 (H) 70 - 110 mg/dL [facility] Collection Time: 11/16/18  8:14 PM  
Result Value Ref Range Glucose 157 mg/dL Insulin order 0.5 units/hour Insulin adminstered 0.5 units/hour Multiplier 0.005 Low target 140 mg/dL High target 180 mg/dL D50 order 0.0 ml  
 D50 administered 0.00 ml Minutes until next BG 60 min Order initials CDW Administered initials CDW GLUCOSE, POC Collection Time: 11/16/18  9:20 PM  
Result Value Ref Range Glucose (POC) 196 (H) 70 - 110 mg/dL [facility] Collection Time: 11/16/18  9:20 PM  
Result Value Ref Range Glucose 196 mg/dL Insulin order 2.0 units/hour Insulin adminstered 2.0 units/hour Multiplier 0.015 Low target 140 mg/dL High target 180 mg/dL D50 order 0.0 ml  
 D50 administered 0.00 ml Minutes until next BG 60 min Order initials cdw Administered initials cdw GLUCOSE, POC Collection Time: 11/16/18 10:24 PM  
Result Value Ref Range Glucose (POC) 218 (H) 70 - 110 mg/dL Ekta Annamarie Collection Time: 11/16/18 10:24 PM  
Result Value Ref Range Glucose 218 mg/dL Insulin order 4.0 units/hour Insulin adminstered 4.0 units/hour Multiplier 0.025 Low target 140 mg/dL High target 180 mg/dL D50 order 0.0 ml  
 D50 administered 0.00 ml Minutes until next BG 60 min Order initials cdw Administered initials cdw GLUCOSE, POC Collection Time: 11/16/18 11:37 PM  
Result Value Ref Range Glucose (POC) 182 (H) 70 - 110 mg/dL Ekta Members Collection Time: 11/16/18 11:38 PM  
Result Value Ref Range Glucose 189 mg/dL Insulin order 4.5 units/hour Insulin adminstered 4.5 units/hour Multiplier 0.035 Low target 140 mg/dL High target 180 mg/dL D50 order 0.0 ml  
 D50 administered 0.00 ml Minutes until next BG 60 min Order initials cdw Administered initials cdw MAGNESIUM Collection Time: 11/17/18 12:28 AM  
Result Value Ref Range Magnesium 2.1 1.6 - 2.6 mg/dL METABOLIC PANEL, BASIC Collection Time: 11/17/18 12:28 AM  
Result Value Ref Range Sodium 139 136 - 145 mmol/L Potassium 3.0 (L) 3.5 - 5.5 mmol/L Chloride 114 (H) 100 - 108 mmol/L  
 CO2 18 (L) 21 - 32 mmol/L Anion gap 7 3.0 - 18 mmol/L Glucose 146 (H) 74 - 99 mg/dL BUN 7 7.0 - 18 MG/DL Creatinine 0.68 0.6 - 1.3 MG/DL  
 BUN/Creatinine ratio 10 (L) 12 - 20 GFR est AA >60 >60 ml/min/1.73m2 GFR est non-AA >60 >60 ml/min/1.73m2 Calcium 7.2 (L) 8.5 - 10.1 MG/DL  
PHOSPHORUS Collection Time: 11/17/18 12:28 AM  
Result Value Ref Range  Phosphorus 0.9 (L) 2.5 - 4.9 MG/DL  
 GLUCOSE, POC Collection Time: 11/17/18 12:46 AM  
Result Value Ref Range Glucose (POC) 152 (H) 70 - 110 mg/dL Roxy Couch Collection Time: 11/17/18 12:47 AM  
Result Value Ref Range Glucose 152 mg/dL Insulin order 3.2 units/hour Insulin adminstered 3.2 units/hour Multiplier 0.035 Low target 140 mg/dL High target 180 mg/dL D50 order 0.0 ml  
 D50 administered 0.00 ml Minutes until next BG 60 min Order initials willy Administered initials willy GLUCOSE, POC Collection Time: 11/17/18  1:51 AM  
Result Value Ref Range Glucose (POC) 124 (H) 70 - 110 mg/dL Roxy Couch Collection Time: 11/17/18  1:52 AM  
Result Value Ref Range Glucose 124 mg/dL Insulin order 1.6 units/hour Insulin adminstered 1.6 units/hour Multiplier 0.025 Low target 140 mg/dL High target 180 mg/dL D50 order 0.0 ml  
 D50 administered 0.00 ml Minutes until next BG 60 min Order initials cdw Administered initials cdw GLUCOSE, POC Collection Time: 11/17/18  2:55 AM  
Result Value Ref Range Glucose (POC) 124 (H) 70 - 110 mg/dL Roxy Couch Collection Time: 11/17/18  2:56 AM  
Result Value Ref Range Glucose 124 mg/dL Insulin order 1.0 units/hour Insulin adminstered 1.0 units/hour Multiplier 0.015 Low target 140 mg/dL High target 180 mg/dL D50 order 0.0 ml  
 D50 administered 0.00 ml Minutes until next BG 60 min Order initials cdw Administered initials cdw GLUCOSE, POC Collection Time: 11/17/18  4:02 AM  
Result Value Ref Range Glucose (POC) 111 (H) 70 - 110 mg/dL Roxy Couch Collection Time: 11/17/18  4:03 AM  
Result Value Ref Range Glucose 111 mg/dL Insulin order 0.3 units/hour Insulin adminstered 0.3 units/hour Multiplier 0.005 Low target 140 mg/dL High target 180 mg/dL D50 order 0.0 ml  
 D50 administered 0.00 ml Minutes until next BG 60 min Order initials cdw Administered initials cdw MAGNESIUM Collection Time: 11/17/18  4:20 AM  
Result Value Ref Range Magnesium 2.1 1.6 - 2.6 mg/dL METABOLIC PANEL, BASIC Collection Time: 11/17/18  4:20 AM  
Result Value Ref Range Sodium 140 136 - 145 mmol/L Potassium 3.6 3.5 - 5.5 mmol/L Chloride 114 (H) 100 - 108 mmol/L  
 CO2 17 (L) 21 - 32 mmol/L Anion gap 9 3.0 - 18 mmol/L Glucose 109 (H) 74 - 99 mg/dL BUN 5 (L) 7.0 - 18 MG/DL Creatinine 0.45 (L) 0.6 - 1.3 MG/DL  
 BUN/Creatinine ratio 11 (L) 12 - 20 GFR est AA >60 >60 ml/min/1.73m2 GFR est non-AA >60 >60 ml/min/1.73m2 Calcium 7.5 (L) 8.5 - 10.1 MG/DL  
CBC WITH AUTOMATED DIFF Collection Time: 11/17/18  4:20 AM  
Result Value Ref Range WBC 10.2 4.6 - 13.2 K/uL  
 RBC 3.78 (L) 4.20 - 5.30 M/uL  
 HGB 11.1 (L) 12.0 - 16.0 g/dL HCT 31.3 (L) 35.0 - 45.0 % MCV 82.8 74.0 - 97.0 FL  
 MCH 29.4 24.0 - 34.0 PG  
 MCHC 35.5 31.0 - 37.0 g/dL  
 RDW 12.5 11.6 - 14.5 % PLATELET 544 963 - 114 K/uL MPV 8.7 (L) 9.2 - 11.8 FL  
 NEUTROPHILS 68 40 - 73 % LYMPHOCYTES 22 21 - 52 % MONOCYTES 9 3 - 10 % EOSINOPHILS 1 0 - 5 % BASOPHILS 0 0 - 2 %  
 ABS. NEUTROPHILS 7.0 1.8 - 8.0 K/UL  
 ABS. LYMPHOCYTES 2.2 0.9 - 3.6 K/UL  
 ABS. MONOCYTES 0.9 0.05 - 1.2 K/UL  
 ABS. EOSINOPHILS 0.1 0.0 - 0.4 K/UL  
 ABS. BASOPHILS 0.0 0.0 - 0.1 K/UL  
 DF AUTOMATED PHOSPHORUS Collection Time: 11/17/18  4:20 AM  
Result Value Ref Range Phosphorus 1.7 (L) 2.5 - 4.9 MG/DL  
GLUCOSE, POC Collection Time: 11/17/18  5:09 AM  
Result Value Ref Range Glucose (POC) 140 (H) 70 - 110 mg/dL Louis Bending Collection Time: 11/17/18  5:10 AM  
Result Value Ref Range Glucose 140 mg/dL Insulin order 0.4 units/hour Insulin adminstered 0.4 units/hour Multiplier 0.005 Low target 140 mg/dL High target 180 mg/dL D50 order 0.0 ml  
 D50 administered 0.00 ml Minutes until next BG 60 min Order initials willy Administered initials willy GLUCOSE, POC Collection Time: 11/17/18  6:15 AM  
Result Value Ref Range Glucose (POC) 209 (H) 70 - 110 mg/dL [facility] Collection Time: 11/17/18  6:15 AM  
Result Value Ref Range Glucose 209 mg/dL Insulin order 2.2 units/hour Insulin adminstered 2.2 units/hour Multiplier 0.015 Low target 140 mg/dL High target 180 mg/dL D50 order 0.0 ml  
 D50 administered 0.00 ml Minutes until next BG 60 min Order initials cdw Administered initials cdw MAGNESIUM Collection Time: 11/17/18  7:20 AM  
Result Value Ref Range Magnesium 1.9 1.6 - 2.6 mg/dL METABOLIC PANEL, BASIC Collection Time: 11/17/18  7:20 AM  
Result Value Ref Range Sodium 141 136 - 145 mmol/L Potassium 3.8 3.5 - 5.5 mmol/L Chloride 114 (H) 100 - 108 mmol/L  
 CO2 17 (L) 21 - 32 mmol/L Anion gap 10 3.0 - 18 mmol/L Glucose 181 (H) 74 - 99 mg/dL BUN 3 (L) 7.0 - 18 MG/DL Creatinine 0.54 (L) 0.6 - 1.3 MG/DL  
 BUN/Creatinine ratio 6 (L) 12 - 20 GFR est AA >60 >60 ml/min/1.73m2 GFR est non-AA >60 >60 ml/min/1.73m2 Calcium 7.2 (L) 8.5 - 10.1 MG/DL  
PHOSPHORUS Collection Time: 11/17/18  7:20 AM  
Result Value Ref Range Phosphorus 2.3 (L) 2.5 - 4.9 MG/DL  
PH, VENOUS Collection Time: 11/17/18  7:20 AM  
Result Value Ref Range VENOUS PH 7.31 (L) 7.32 - 7.42 GLUCOSE, POC Collection Time: 11/17/18  7:23 AM  
Result Value Ref Range Glucose (POC) 213 (H) 70 - 110 mg/dL [facility] Collection Time: 11/17/18  7:23 AM  
Result Value Ref Range Glucose 213 mg/dL Insulin order 3.8 units/hour Insulin adminstered 3.8 units/hour Multiplier 0.025 Low target 140 mg/dL High target 180 mg/dL D50 order 0.0 ml  
 D50 administered 0.00 ml Minutes until next BG 60 min Order initials chp   
 Administered initials chp   
GLUCOSE, POC Collection Time: 11/17/18  8:45 AM  
Result Value Ref Range Glucose (POC) 167 (H) 70 - 110 mg/dL Amanda Borne Collection Time: 11/17/18  8:47 AM  
Result Value Ref Range Glucose 167 mg/dL Insulin order 2.7 units/hour Insulin adminstered 2.7 units/hour Multiplier 0.025 Low target 140 mg/dL High target 180 mg/dL D50 order 0.0 ml  
 D50 administered 0.00 ml Minutes until next BG 60 min Order initials chp   
 Administered initials chp   
GLUCOSE, POC Collection Time: 11/17/18  9:57 AM  
Result Value Ref Range Glucose (POC) 130 (H) 70 - 110 mg/dL GLUCOSE, POC Collection Time: 11/17/18 10:40 AM  
Result Value Ref Range Glucose (POC) 115 (H) 70 - 110 mg/dL Amanda Borne Collection Time: 11/17/18 10:41 AM  
Result Value Ref Range Glucose 115 mg/dL Insulin order 0.8 units/hour Insulin adminstered 0.8 units/hour Multiplier 0.015 Low target 140 mg/dL High target 180 mg/dL D50 order 0.0 ml  
 D50 administered 0.00 ml Minutes until next BG 60 min Order initials chp   
 Administered initials chp Additional Data Reviewed:   
 
Signed By: Harjit Cutler MD   
 November 17, 2018 1:57 PM

## 2023-12-19 NOTE — ED PROVIDER NOTES
A (CATHETER BLN 7MM 60MM 1300MM) balloon was inflated in the right proximal popliteal artery. Balloon removed in tact.  The balloon was inflated at 14 addie for 170 seconds at 12/22/2023 11:52 AM. HPI Comments: 10:24 AM Radha Ruiz is a 25 y.o. female who presents to ED for the evaluation of a dog bite on her right upper arm. Patient states that she was at the [facility] when she was bitten by a pitbull in her arm. Patient states that her Tetanus is not up to date. No known drug allergies. No other complaints, associated symptoms or modifying factors at this time. Patient is a 25 y.o. female presenting with dog bite. The history is provided by the patient. Dog Bite   Pertinent negatives include no chest pain, no abdominal pain, no headaches and no shortness of breath. No past medical history on file. No past surgical history on file. No family history on file. Social History     Social History    Marital status: SINGLE     Spouse name: N/A    Number of children: N/A    Years of education: N/A     Occupational History    Not on file. Social History Main Topics    Smoking status: Not on file    Smokeless tobacco: Not on file    Alcohol use Not on file    Drug use: Not on file    Sexual activity: Not on file     Other Topics Concern    Not on file     Social History Narrative         ALLERGIES: Review of patient's allergies indicates no known allergies. Review of Systems   Constitutional: Negative for chills, fatigue and fever. HENT: Negative. Negative for sore throat. Eyes: Negative. Respiratory: Negative for cough and shortness of breath. Cardiovascular: Negative for chest pain and palpitations. Gastrointestinal: Negative for abdominal pain, nausea and vomiting. Genitourinary: Negative for dysuria. Musculoskeletal: Negative. Skin: Positive for wound (dog bite to right upper arm). Neurological: Negative for dizziness, weakness, light-headedness and headaches. Psychiatric/Behavioral: Negative. All other systems reviewed and are negative.       Vitals:    04/13/17 1016   BP: 110/80   Pulse: 99   Resp: 16   Temp: 97.3 °F (36.3 °C)   SpO2: 99%   Weight: 53.5 kg (118 lb)   Height: 5' 8\" (1.727 m)            Physical Exam   Constitutional: She appears well-developed and well-nourished. No distress. HENT:   Head: Normocephalic and atraumatic. Right Ear: External ear normal.   Left Ear: External ear normal.   Eyes: Conjunctivae are normal.   Neck: Normal range of motion. Neck supple. Cardiovascular: Normal rate and regular rhythm. Pulmonary/Chest: Effort normal and breath sounds normal. She has no wheezes. Musculoskeletal:        Arms:  Skin: She is not diaphoretic. MDM  Number of Diagnoses or Management Options  Dog bite of right arm, initial encounter:   Diagnosis management comments: Pt with unprovoked dog bite to right upper arm. Irrigated copiously with NS. 3 of the deeper wounds were loosely approximated. Partial tendon tear at elbow, but ROM intact. RX for augmentin, pain meds. Ortho f/u. KYREE Arellano 1:49 PM  Discussed with Dr. Wendie Miner, orthopedist. Recommends office f/u tomorrow or Monday. Plan discussed with pt, she agrees. KYREE Arellano 2:38 PM    Splint Note      Type of Splint: sling  Location: right    Applied by rn neurovascular intact prior to splint placement neurovascular intact after splint placement splint is placed in good position.      KYREE Arellano  April 13, 2017  2:38 PM       Amount and/or Complexity of Data Reviewed  Tests in the radiology section of CPT®: ordered and reviewed    Risk of Complications, Morbidity, and/or Mortality  Presenting problems: moderate  Diagnostic procedures: moderate  Management options: low    Patient Progress  Patient progress: improved    ED Course       Wound Repair  Date/Time: 4/13/2017 1:44 PM  Performed by: [id number] [facility] provider: Dr. Keesha Patel  Preparation: skin prepped with Betadine  Pre-procedure re-eval: Immediately prior to the procedure, the patient was reevaluated and found suitable for the planned procedure and any planned medications. Location details: right arm  Wound length:2.6 - 7.5 cm  Anesthesia: local infiltration    Anesthesia:  Anesthesia: local infiltration  Local Anesthetic: LET (lido,epi,tetracaine) and lidocaine 1% without epinephrine   Anesthetic total: 5 mL  Foreign bodies: no foreign bodies  Irrigation solution: saline  Irrigation method: syringe  Debridement: none  Skin closure: 4-0 nylon  Number of sutures: 7  Technique: simple and interrupted  Approximation: loose  Dressing: 4x4, antibiotic ointment, gauze roll and pressure dressing  Patient tolerance: Patient tolerated the procedure well with no immediate complications  My total time at bedside, performing this procedure was 16-30 minutes. Comments: There is partial tendon involvement to the lac proximal to the elbow. Vitals:  Patient Vitals for the past 12 hrs:   Temp Pulse Resp BP SpO2   04/13/17 1016 97.3 °F (36.3 °C) 99 16 110/80 99 %     99% on RA, indicating adequate oxygenation. Medications ordered:   Medications   diph,Pertuss(AC),Tet Vac-PF (BOOSTRIX) suspension 0.5 mL (0.5 mL IntraMUSCular Incomplete 4/13/17 1048)   0.9% sodium chloride infusion 1,000 mL (0 mL IntraVENous IV Completed 4/13/17 1346)   ondansetron (ZOFRAN) injection 4 mg (4 mg IntraVENous Given 4/13/17 1035)   morphine injection 4 mg (4 mg IntraVENous Given 4/13/17 1035)   lidocaine/EPINEPHrine/tetracaine (LET) topical soln (5 mL Topical Given by Provider 4/13/17 1047)   HYDROmorphone (PF) (DILAUDID) injection 0.5 mg (0.5 mg IntraVENous Given 4/13/17 1125)   oxyCODONE-acetaminophen (PERCOCET) 5-325 mg per tablet 1 Tab (1 Tab Oral Given 4/13/17 1343)   amoxicillin-clavulanate (AUGMENTIN) 875-125 mg per tablet 1 Tab (1 Tab Oral Given 4/13/17 1345)         Lab findings:  No results found for this or any previous visit (from the past 12 hour(s)). X-Ray, CT or other radiology findings or impressions:  XR HUMERUS RT   Final Result        Disposition:  Diagnosis:   1.  Dog bite of right arm, initial encounter        Disposition: discharged    Follow-up Information     Follow up With Details Comments 1000 Erica Malloy MD   [address]  [phone]             Patient's Medications   Start Taking    AMOXICILLIN-CLAVULANATE (AUGMENTIN) 875-125 MG PER TABLET    Take 1 Tab by mouth two (2) times a day. NAPROXEN (NAPROSYN) 375 MG TABLET    Take 1 Tab by mouth two (2) times daily (with meals). OXYCODONE-ACETAMINOPHEN (PERCOCET) 5-325 MG PER TABLET    1-2 tabs every 4 hours prn   Continue Taking    No medications on file   These Medications have changed    No medications on file   Stop Taking    No medications on file       Scribe [address] [place] [address] for and in the presence of KYREE Reynolds 10:32 AM, 04/13/17. Signed by: Kevan Hobson, 10:32 AM, 04/13/17. Physician Attestation  I personally performed the services described in the documentation, reviewed and edited the documentation which was dictated to the scribe in my presence, and it accurately records my words and actions.   KYREE Reynolds 10:32 AM, 04/13/17

## (undated) DEVICE — COTTON UNDERCAST PADDING,REGULAR FINISH: Brand: WEBRIL

## (undated) DEVICE — DRAIN SURG 15FR L3/16IN DIA4.7MM SIL CHN RND HUBLESS FULL

## (undated) DEVICE — INTENDED FOR TISSUE SEPARATION, AND OTHER PROCEDURES THAT REQUIRE A SHARP SURGICAL BLADE TO PUNCTURE OR CUT.: Brand: BARD-PARKER ® STAINLESS STEEL BLADES

## (undated) DEVICE — KENDALL SCD EXPRESS SLEEVES, KNEE LENGTH, MEDIUM: Brand: KENDALL SCD

## (undated) DEVICE — FABRIC REINFORCED, SURGICAL GOWN, XL: Brand: CONVERTORS

## (undated) DEVICE — KERLIX BANDAGE ROLL: Brand: KERLIX

## (undated) DEVICE — DRAPE SHEET, X-LARGE: Brand: CONVERTORS

## (undated) DEVICE — TABLE COVER: Brand: CONVERTORS

## (undated) DEVICE — SKIN MARKER,REGULAR TIP WITH RULER AND LABELS: Brand: DEVON

## (undated) DEVICE — SUTURE MCRYL SZ 3-0 L27IN ABSRB UD L19MM PS-2 3/8 CIR PRIM Y427H

## (undated) DEVICE — BANDAGE COMPR 9 FTX4 IN SMOOTH COMFORTABLE SYNTH ESMRK LF

## (undated) DEVICE — OCCLUSIVE GAUZE STRIP,3% BISMUTH TRIBROMOPHENATE IN PETROLATUM BLEND: Brand: XEROFORM

## (undated) DEVICE — STERILE LATEX POWDER-FREE SURGICAL GLOVESWITH NITRILE COATING: Brand: PROTEXIS

## (undated) DEVICE — BLADE TNGE REG 6IN WOOD STRL -- CONVERT TO ITEM 153408

## (undated) DEVICE — DISPOSABLE TOURNIQUET CUFF SINGLE BLADDER, SINGLE PORT AND QUICK CONNECT CONNECTOR: Brand: COLOR CUFF

## (undated) DEVICE — STOCKINETTE CST 2 PLY 6INX25YD --

## (undated) DEVICE — BAND RUB 1/8X2.5IN STRL --

## (undated) DEVICE — SYSTEM SKIN CLSR 22CM DERMBND PRINEO

## (undated) DEVICE — PENROSE TUBING RADIOPAQUE: Brand: ARGYLE

## (undated) DEVICE — DRAPE,U/ SHT,SPLIT,PLAS,STERIL: Brand: MEDLINE

## (undated) DEVICE — MAYO STAND COVER: Brand: CONVERTORS

## (undated) DEVICE — GAUZE SPONGES,12 PLY: Brand: CURITY

## (undated) DEVICE — DRAPE,EXTREMITY,89X128,STERILE: Brand: MEDLINE

## (undated) DEVICE — SUTURE PROL SZ 6-0 L18IN NONABSORBABLE BLU L13MM PC-1 3/8 8617G

## (undated) DEVICE — SYRINGE MED 3ML NDL 22GA L1.5IN PLAS N CTRL LUERLOCK TIP

## (undated) DEVICE — SUTURE VCRL SZ 3-0 L27IN ABSRB UD L26MM SH 1/2 CIR J416H

## (undated) DEVICE — HEX-LOCKING BLADE ELECTRODE: Brand: EDGE

## (undated) DEVICE — SMALL HAND BAG: Brand: DEROYAL

## (undated) DEVICE — SUTURE MCRYL SZ 5-0 L18IN ABSRB UD PC-1 L13MM 3/8 CIR Y834G

## (undated) DEVICE — BIPOLAR FORCEPS CORD,BANANA LEADS: Brand: VALLEYLAB

## (undated) DEVICE — GOWN,SIRUS,FABRNF,XL,20/CS: Brand: MEDLINE

## (undated) DEVICE — GOWN,SIRUS,NONRNF,SETINSLV,2XL,18/CS: Brand: MEDLINE

## (undated) DEVICE — DEPAUL MAJOR PROCEDURE PACK: Brand: MEDLINE INDUSTRIES, INC.

## (undated) DEVICE — EYETEC CELLULOSE SPEARS 10 PER POUCH: Brand: EYETEC

## (undated) DEVICE — SUTURE MCRYL SZ 4-0 L18IN ABSRB UD L19MM PS-2 3/8 CIR PRIM Y496G

## (undated) DEVICE — INTENDED FOR TISSUE SEPARATION, AND OTHER PROCEDURES THAT REQUIRE A SHARP SURGICAL BLADE TO PUNCTURE OR CUT.: Brand: BARD-PARKER ® CARBON RIB-BACK BLADES

## (undated) DEVICE — STOCKINETTE TUBE BLN 2PLY 6X72 -- MEDICHOICE CONVERT TO 363488

## (undated) DEVICE — BNDG CMPR ELAS KNT VEL STD 3IN -- MEDICHOICE

## (undated) DEVICE — TOWEL SURG W16XL26IN BLU NONFENESTRATED DLX ST 2 PER PK

## (undated) DEVICE — SUTURE NONABSORBABLE MONOFILAMENT 5-0 C-1 1X24 IN PROLENE 8725H

## (undated) DEVICE — (D)SYR 10ML 1/5ML GRAD NSAF -- PKGING CHANGE USE ITEM 338027

## (undated) DEVICE — STERILE POLYISOPRENE POWDER-FREE SURGICAL GLOVES: Brand: PROTEXIS

## (undated) DEVICE — SUTURE ETHLN SZ 8-0 L5IN NONABSORBABLE BLK L5MM BV130-4 3/8 2815G

## (undated) DEVICE — STAPLER SKIN H3.9MM WIRE DIA0.58MM CRWN 6.9MM 35 STPL FIX

## (undated) DEVICE — CANNULA INJ L2.5IN BLNT TIP 3MM CLR BODY W/ 1 W VLV DLP

## (undated) DEVICE — TRNQT RMFG CUFF SGL BLDR SGL P -- LAWSON OEM ITEM 338161

## (undated) DEVICE — SURGICAL NERVE LOCATOR/STIMULATOR: Brand: CARDINAL HEALTH

## (undated) DEVICE — VESSEL LOOPS,MINI, BLUE: Brand: DEVON

## (undated) DEVICE — PADDING CAST W4INXL4YD ST COT COHESIVE HND TEARABLE SPEC

## (undated) DEVICE — REM POLYHESIVE ADULT PATIENT RETURN ELECTRODE: Brand: VALLEYLAB

## (undated) DEVICE — SUT MERS 3-0 18IN FS1 WHT --

## (undated) DEVICE — SOL IRR NACL 0.9% 500ML POUR --